# Patient Record
Sex: FEMALE | Race: WHITE | NOT HISPANIC OR LATINO | Employment: FULL TIME | URBAN - METROPOLITAN AREA
[De-identification: names, ages, dates, MRNs, and addresses within clinical notes are randomized per-mention and may not be internally consistent; named-entity substitution may affect disease eponyms.]

---

## 2023-06-20 ENCOUNTER — HOSPITAL ENCOUNTER (OUTPATIENT)
Dept: DATA CONVERSION | Facility: HOSPITAL | Age: 58
End: 2023-06-20
Attending: OPHTHALMOLOGY | Admitting: OPHTHALMOLOGY
Payer: COMMERCIAL

## 2023-06-20 DIAGNOSIS — E78.5 HYPERLIPIDEMIA, UNSPECIFIED: ICD-10-CM

## 2023-06-20 DIAGNOSIS — H02.831 DERMATOCHALASIS OF RIGHT UPPER EYELID: ICD-10-CM

## 2023-06-20 DIAGNOSIS — H02.834 DERMATOCHALASIS OF LEFT UPPER EYELID: ICD-10-CM

## 2023-06-20 DIAGNOSIS — G47.33 OBSTRUCTIVE SLEEP APNEA (ADULT) (PEDIATRIC): ICD-10-CM

## 2023-06-20 DIAGNOSIS — I10 ESSENTIAL (PRIMARY) HYPERTENSION: ICD-10-CM

## 2023-06-20 DIAGNOSIS — K21.9 GASTRO-ESOPHAGEAL REFLUX DISEASE WITHOUT ESOPHAGITIS: ICD-10-CM

## 2023-09-30 NOTE — H&P
History of Present Illness:   Pregnant/Lactating:  ·  Are You Pregnant no   ·  Are You Currently Breastfeeding no     History Present Illness:  Reason for surgery: Bilateral upper eyelid dermatochalasis   HPI:    Pt is a 58 YOF presenting for BL upper eyelid blepharoplasty.    Allergies:        Allergies:  ·  No Known Allergies :     Home Medication Review:   Home Medications Reviewed: no     Impression/Procedure:   ·  Impression and Planned Procedure: BL upper eyelid blepharoplasty       ERAS (Enhanced Recovery After Surgery):  ·  ERAS Patient: no       Physical Exam by System:    Constitutional: Well developed, awake/alert/oriented  x3, no distress, alert and cooperative   Eyes: PERRL, EOMI, clear sclera   Head/Neck: Neck supple   Respiratory/Thorax: Normal WOB   Cardiovascular: Ascultation per anesthesia   Gastrointestinal: abdomen nondistended   Neurological: alert and oriented x3   Psychological: Appropriate mood and behavior   Skin: Warm and dry, no lesions, no rashes     Consent:   COVID-19 Consent:  ·  COVID-19 Risk Consent Surgeon has reviewed key risks related to the risk of yany COVID-19 and if they contract COVID-19 what the risks are.     Attestation:   Note Completion:  I am a:  Resident/Fellow   Attending Attestation I saw and evaluated the patient.  I personally obtained the key and critical portions of the history and physical exam or was physically present for key and  critical portions performed by the resident/fellow. I reviewed the resident/fellow?s documentation and discussed the patient with the resident/fellow.  I agree with the resident/fellow?s medical decision making as documented in the note.     I personally evaluated the patient on 20-Jun-2023         Electronic Signatures:  Ravindra Valle)  (Signed 20-Jun-2023 15:32)   Authored: Note Completion   Co-Signer: History of Present Illness, Allergies, Home Medication Review, Impression/Procedure, ERAS, Physical Exam, Consent,  Note Completion  Megan Torres (Resident))  (Signed 20-Jun-2023 12:21)   Authored: History of Present Illness, Allergies, Home  Medication Review, Impression/Procedure, ERAS, Physical Exam, Consent, Note Completion      Last Updated: 20-Jun-2023 15:32 by Ravindra Valle)

## 2023-10-02 NOTE — OP NOTE
Post Operative Note:     PreOp Diagnosis: Bilateral upper eyelid dermatochalasis   Post-Procedure Diagnosis: Bilateral upper eyelid  dermatochalasis   Procedure: 1. Bilateral upper eyelid blepharoplasty   Surgeon: Viola   Resident/Fellow/Other Assistant: Bishop   Anesthesia: MAC   Estimated Blood Loss (mL): none   Specimen: no   Complications: None   Findings: Bilateral upper eyelid dermatochalasis   Patient Returned To/Condition: PACU/Good     Operative Report Dictated:  Dictation: not applicable - note contains Operative  Report   Operative Report:    The patient was taken to the operating room and placed on the table in the supine position, where anesthesia was induced. 2% lidocaine  with epinephrine and 0.5% marcaine in a 1:1 fashion was injected over the surgical site, and the patient was prepped and draped in the usual manner for orbitofacial surgery.     A 15 Bard-Randall blade incision was made 10 mm from the eyelash margin, across the entire horizontal extent of the right upper eyelid. A second incision was made 10 mm inferior to the junction of the brow and eyelid, and a pinch test was used to ensure  the amount of skin excision was appropriate. The intervening tissue as well as excess orbicularis tissue was excised with Bovie electrocautery on the blended cut setting. Bleeding was controlled with electrocauterization. The skin was then closed with  5-0 fast absorbing gut suture in an interrupted fashion, with care to incorporate the pretarsal orbicularis over the pupil, nasal and temporal limbi respectively, as well as 6-0 prolene suture in a running fashion. The lid position and contour were examined  and found to be satisfactory.     The same procedure was then repeated in the left eye.     The face was then cleaned with sterile saline and antibiotic ophthalmic ointment was placed over the surgical sites.      The patient was then awakened and taken from the operating room in good condition,  having tolerated the procedure well. There were no complications, and the estimated blood loss was less than  1 cc.      Attestation:   Note Completion:  I am a: Resident/Fellow   Attending Attestation I was present for the entire procedure          Electronic Signatures:  Jenniffer Alas (MD (Resident))  (Signed 20-Jun-2023 15:29)   Authored: Post Operative Note  Ravindra Valle)  (Signed 20-Jun-2023 15:36)   Authored: Post Operative Note, Note Completion   Co-Signer: Post Operative Note, Note Completion  Megan Torres (Resident))  (Signed 20-Jun-2023 14:35)   Authored: Post Operative Note, Note Completion      Last Updated: 20-Jun-2023 15:36 by Ravindra Valle)

## 2023-11-28 ENCOUNTER — OFFICE VISIT (OUTPATIENT)
Dept: PRIMARY CARE | Facility: CLINIC | Age: 58
End: 2023-11-28
Payer: COMMERCIAL

## 2023-11-28 VITALS
RESPIRATION RATE: 16 BRPM | HEART RATE: 67 BPM | BODY MASS INDEX: 30.61 KG/M2 | DIASTOLIC BLOOD PRESSURE: 90 MMHG | TEMPERATURE: 96.8 F | HEIGHT: 67 IN | OXYGEN SATURATION: 96 % | WEIGHT: 195 LBS | SYSTOLIC BLOOD PRESSURE: 110 MMHG

## 2023-11-28 DIAGNOSIS — Z00.00 WELLNESS EXAMINATION: ICD-10-CM

## 2023-11-28 DIAGNOSIS — E78.00 PURE HYPERCHOLESTEROLEMIA: ICD-10-CM

## 2023-11-28 DIAGNOSIS — N95.9 MENOPAUSAL DISORDER: Primary | ICD-10-CM

## 2023-11-28 DIAGNOSIS — G47.30 SLEEP APNEA, UNSPECIFIED TYPE: ICD-10-CM

## 2023-11-28 DIAGNOSIS — Z29.9 PREVENTIVE MEASURE: ICD-10-CM

## 2023-11-28 PROCEDURE — 99204 OFFICE O/P NEW MOD 45 MIN: CPT | Performed by: INTERNAL MEDICINE

## 2023-11-28 RX ORDER — PRAVASTATIN SODIUM 80 MG/1
80 TABLET ORAL DAILY
COMMUNITY
Start: 2023-09-06 | End: 2024-01-05 | Stop reason: SDUPTHER

## 2023-11-28 RX ORDER — SERTRALINE HYDROCHLORIDE 100 MG/1
100 TABLET, FILM COATED ORAL NIGHTLY
COMMUNITY
Start: 2023-09-14 | End: 2023-12-23 | Stop reason: SDUPTHER

## 2023-11-28 RX ORDER — OMEPRAZOLE 20 MG/1
20 CAPSULE, DELAYED RELEASE ORAL
COMMUNITY
End: 2024-03-26 | Stop reason: DRUGHIGH

## 2023-11-28 ASSESSMENT — ENCOUNTER SYMPTOMS
JOINT SWELLING: 0
BACK PAIN: 0
DYSURIA: 0
BLOOD IN STOOL: 0
EYE REDNESS: 0
FATIGUE: 0
AGITATION: 0
ARTHRALGIAS: 0
COUGH: 0
NUMBNESS: 0
SHORTNESS OF BREATH: 0
ABDOMINAL PAIN: 0
WHEEZING: 0
WEAKNESS: 0
ADENOPATHY: 0
ENDOCRINE NEGATIVE: 1
DIZZINESS: 0
HEMATURIA: 0
FREQUENCY: 0
HEADACHES: 0
ALLERGIC/IMMUNOLOGIC NEGATIVE: 1
FEVER: 0
CONSTIPATION: 0
PALPITATIONS: 0
ACTIVITY CHANGE: 0

## 2023-11-28 NOTE — PROGRESS NOTES
"Subjective   Patient ID: Piedad Swann is a 58 y.o. female who presents for Establish Care.    She is a Nurse Practitioner , and here to get established.  She has several medical issues , meds reviewd.  She has bladder CA , getting treatment per urology, Dr Grace  She is Exsmoker, smoked < 1/2 ppd, quit in 2019  She has sleep apnea, but does not use cpap currently.    + FH of cancers  Mom- DM, Breast CA  Sis -  , Cholangiocarcinoma  Colon cancer in family         Review of Systems   Constitutional:  Negative for activity change, fatigue and fever.   HENT:  Negative for congestion.    Eyes:  Negative for redness and visual disturbance.   Respiratory:  Negative for cough, shortness of breath and wheezing.    Cardiovascular:  Negative for chest pain, palpitations and leg swelling.   Gastrointestinal:  Negative for abdominal pain, blood in stool and constipation.   Endocrine: Negative.    Genitourinary:  Negative for dysuria, frequency, hematuria and urgency.        Bladder spasms   Musculoskeletal:  Negative for arthralgias, back pain and joint swelling.   Skin:  Negative for rash.   Allergic/Immunologic: Negative.    Neurological:  Negative for dizziness, weakness, numbness and headaches.        Snoring, daytime sleepiness   Hematological:  Negative for adenopathy.   Psychiatric/Behavioral:  Negative for agitation and behavioral problems.        Objective   /90 (BP Location: Left arm, Patient Position: Sitting, BP Cuff Size: Adult)   Pulse 67   Temp 36 °C (96.8 °F) (Temporal)   Resp 16   Ht 1.702 m (5' 7\")   Wt 88.5 kg (195 lb)   SpO2 96%   BMI 30.54 kg/m²     Physical Exam  Constitutional:       Appearance: Normal appearance.   HENT:      Head: Normocephalic and atraumatic.      Right Ear: Tympanic membrane and external ear normal.      Left Ear: Tympanic membrane and external ear normal.      Nose: No congestion.      Mouth/Throat:      Mouth: Mucous membranes are moist.      Pharynx: Oropharynx " is clear.   Eyes:      Extraocular Movements: Extraocular movements intact.      Conjunctiva/sclera: Conjunctivae normal.      Pupils: Pupils are equal, round, and reactive to light.   Cardiovascular:      Rate and Rhythm: Normal rate and regular rhythm.      Pulses: Normal pulses.      Heart sounds: Normal heart sounds. No murmur heard.  Pulmonary:      Effort: Pulmonary effort is normal.      Breath sounds: Normal breath sounds. No wheezing or rales.   Abdominal:      General: Abdomen is flat. Bowel sounds are normal.      Palpations: Abdomen is soft.      Hernia: No hernia is present.   Musculoskeletal:         General: No swelling or tenderness. Normal range of motion.      Cervical back: Normal range of motion and neck supple.      Right lower leg: No edema.      Left lower leg: No edema.   Skin:     General: Skin is warm and dry.      Capillary Refill: Capillary refill takes less than 2 seconds.      Findings: No rash.   Neurological:      General: No focal deficit present.      Mental Status: She is alert and oriented to person, place, and time.   Psychiatric:         Mood and Affect: Mood normal.         Behavior: Behavior normal.         Assessment/Plan        Bladder Carcinoma:  Since 2019   She follows with Dr Grace  Plan for bladeer wash chemo soon  She had multiple cystoscopy    Sleep apnea:  Refer to Dr Morley , sleep Medicine  Sleep study, cpap titration     GERD:  Avoid spicy , fried food, caffeine, NSAIDs  She does not smoke, alcohol socially     Hyperlipidemia:  Statin    Anxiety and depression:  Zoloft po daily    Postmenopausal:  Mammogram , BMD  Calcium and vit D    Vision check  Flushot, covid booster advised  Colonoscopy , not due, had once, and was normal

## 2023-12-12 ENCOUNTER — APPOINTMENT (OUTPATIENT)
Dept: RADIOLOGY | Facility: CLINIC | Age: 58
End: 2023-12-12
Payer: COMMERCIAL

## 2023-12-23 DIAGNOSIS — F33.9 EPISODE OF RECURRENT MAJOR DEPRESSIVE DISORDER, UNSPECIFIED DEPRESSION EPISODE SEVERITY (CMS-HCC): Primary | ICD-10-CM

## 2023-12-23 DIAGNOSIS — F33.9 EPISODE OF RECURRENT MAJOR DEPRESSIVE DISORDER, UNSPECIFIED DEPRESSION EPISODE SEVERITY (CMS-HCC): ICD-10-CM

## 2023-12-23 RX ORDER — SERTRALINE HYDROCHLORIDE 100 MG/1
100 TABLET, FILM COATED ORAL NIGHTLY
Qty: 30 TABLET | Refills: 0 | Status: SHIPPED | OUTPATIENT
Start: 2023-12-23 | End: 2023-12-26

## 2023-12-26 RX ORDER — SERTRALINE HYDROCHLORIDE 100 MG/1
100 TABLET, FILM COATED ORAL NIGHTLY
Qty: 30 TABLET | Refills: 0 | Status: SHIPPED | OUTPATIENT
Start: 2023-12-26 | End: 2024-01-05 | Stop reason: SDUPTHER

## 2024-01-02 ENCOUNTER — APPOINTMENT (OUTPATIENT)
Dept: RADIOLOGY | Facility: CLINIC | Age: 59
End: 2024-01-02
Payer: COMMERCIAL

## 2024-01-05 DIAGNOSIS — F33.9 EPISODE OF RECURRENT MAJOR DEPRESSIVE DISORDER, UNSPECIFIED DEPRESSION EPISODE SEVERITY (CMS-HCC): ICD-10-CM

## 2024-01-05 RX ORDER — SERTRALINE HYDROCHLORIDE 100 MG/1
100 TABLET, FILM COATED ORAL NIGHTLY
Qty: 30 TABLET | Refills: 0 | Status: SHIPPED | OUTPATIENT
Start: 2024-01-05 | End: 2024-01-25 | Stop reason: SDUPTHER

## 2024-01-05 RX ORDER — PRAVASTATIN SODIUM 80 MG/1
80 TABLET ORAL DAILY
Qty: 90 TABLET | Refills: 1 | Status: SHIPPED | OUTPATIENT
Start: 2024-01-05 | End: 2024-03-26 | Stop reason: SDUPTHER

## 2024-01-05 NOTE — TELEPHONE ENCOUNTER
An error occurred while processing the e-prescribing message. THIS MESSAGE CAME UP ON THE LAST REFILL MESSAGE 12/26/2023    SHE NEVER GOT HER SCRIPT    ASKING IF THIS CAN BE SENT IN AGAIN?

## 2024-01-09 ENCOUNTER — APPOINTMENT (OUTPATIENT)
Dept: RADIOLOGY | Facility: CLINIC | Age: 59
End: 2024-01-09
Payer: COMMERCIAL

## 2024-01-16 ENCOUNTER — APPOINTMENT (OUTPATIENT)
Dept: RADIOLOGY | Facility: CLINIC | Age: 59
End: 2024-01-16
Payer: COMMERCIAL

## 2024-01-18 ENCOUNTER — ANCILLARY PROCEDURE (OUTPATIENT)
Dept: RADIOLOGY | Facility: CLINIC | Age: 59
End: 2024-01-18
Payer: COMMERCIAL

## 2024-01-18 DIAGNOSIS — N95.9 MENOPAUSAL DISORDER: ICD-10-CM

## 2024-01-18 DIAGNOSIS — Z29.9 PREVENTIVE MEASURE: ICD-10-CM

## 2024-01-18 PROCEDURE — 77067 SCR MAMMO BI INCL CAD: CPT | Performed by: RADIOLOGY

## 2024-01-18 PROCEDURE — 77063 BREAST TOMOSYNTHESIS BI: CPT | Performed by: RADIOLOGY

## 2024-01-18 PROCEDURE — 77067 SCR MAMMO BI INCL CAD: CPT

## 2024-01-19 ENCOUNTER — HOSPITAL ENCOUNTER (OUTPATIENT)
Dept: RADIOLOGY | Facility: EXTERNAL LOCATION | Age: 59
Discharge: HOME | End: 2024-01-19

## 2024-01-25 ENCOUNTER — TELEPHONE (OUTPATIENT)
Dept: PRIMARY CARE | Facility: CLINIC | Age: 59
End: 2024-01-25
Payer: COMMERCIAL

## 2024-01-25 DIAGNOSIS — F33.9 EPISODE OF RECURRENT MAJOR DEPRESSIVE DISORDER, UNSPECIFIED DEPRESSION EPISODE SEVERITY (CMS-HCC): ICD-10-CM

## 2024-01-25 RX ORDER — SERTRALINE HYDROCHLORIDE 100 MG/1
100 TABLET, FILM COATED ORAL NIGHTLY
Qty: 90 TABLET | Refills: 0 | Status: SHIPPED | OUTPATIENT
Start: 2024-01-25 | End: 2024-03-26 | Stop reason: SDUPTHER

## 2024-01-25 NOTE — TELEPHONE ENCOUNTER
Patient called asking for her Sertraline 100 mg to be sent as a 90 day qty. Instead of it being a qty. Of 30.  Thank you.    Sent to pharmacy in chart.

## 2024-01-25 NOTE — TELEPHONE ENCOUNTER
----- Message from Eric Shipman MD sent at 1/23/2024  3:08 PM EST -----  Normal mammogram report, negative

## 2024-03-05 ENCOUNTER — TELEPHONE (OUTPATIENT)
Dept: PRIMARY CARE | Facility: CLINIC | Age: 59
End: 2024-03-05
Payer: COMMERCIAL

## 2024-03-05 ENCOUNTER — HOSPITAL ENCOUNTER (OUTPATIENT)
Dept: RADIOLOGY | Facility: CLINIC | Age: 59
Discharge: HOME | End: 2024-03-05
Payer: COMMERCIAL

## 2024-03-05 DIAGNOSIS — N95.9 MENOPAUSAL DISORDER: ICD-10-CM

## 2024-03-05 PROCEDURE — 77080 DXA BONE DENSITY AXIAL: CPT

## 2024-03-05 PROCEDURE — 77080 DXA BONE DENSITY AXIAL: CPT | Performed by: RADIOLOGY

## 2024-03-23 ENCOUNTER — LAB (OUTPATIENT)
Dept: LAB | Facility: LAB | Age: 59
End: 2024-03-23
Payer: COMMERCIAL

## 2024-03-23 DIAGNOSIS — E78.00 PURE HYPERCHOLESTEROLEMIA: ICD-10-CM

## 2024-03-23 DIAGNOSIS — N95.9 MENOPAUSAL DISORDER: ICD-10-CM

## 2024-03-23 DIAGNOSIS — Z00.00 WELLNESS EXAMINATION: ICD-10-CM

## 2024-03-23 DIAGNOSIS — G47.30 SLEEP APNEA, UNSPECIFIED TYPE: ICD-10-CM

## 2024-03-23 PROCEDURE — 85025 COMPLETE CBC W/AUTO DIFF WBC: CPT

## 2024-03-23 PROCEDURE — 80053 COMPREHEN METABOLIC PANEL: CPT

## 2024-03-23 PROCEDURE — 36415 COLL VENOUS BLD VENIPUNCTURE: CPT

## 2024-03-23 PROCEDURE — 83036 HEMOGLOBIN GLYCOSYLATED A1C: CPT

## 2024-03-23 PROCEDURE — 82306 VITAMIN D 25 HYDROXY: CPT

## 2024-03-23 PROCEDURE — 84443 ASSAY THYROID STIM HORMONE: CPT

## 2024-03-23 PROCEDURE — 80061 LIPID PANEL: CPT

## 2024-03-24 LAB
25(OH)D3 SERPL-MCNC: 26 NG/ML (ref 30–100)
ALBUMIN SERPL BCP-MCNC: 4.3 G/DL (ref 3.4–5)
ALP SERPL-CCNC: 88 U/L (ref 33–110)
ALT SERPL W P-5'-P-CCNC: 31 U/L (ref 7–45)
ANION GAP SERPL CALC-SCNC: 14 MMOL/L (ref 10–20)
AST SERPL W P-5'-P-CCNC: 21 U/L (ref 9–39)
BASOPHILS # BLD AUTO: 0.06 X10*3/UL (ref 0–0.1)
BASOPHILS NFR BLD AUTO: 0.8 %
BILIRUB SERPL-MCNC: 0.4 MG/DL (ref 0–1.2)
BUN SERPL-MCNC: 10 MG/DL (ref 6–23)
CALCIUM SERPL-MCNC: 9.4 MG/DL (ref 8.6–10.6)
CHLORIDE SERPL-SCNC: 103 MMOL/L (ref 98–107)
CHOLEST SERPL-MCNC: 222 MG/DL (ref 0–199)
CHOLESTEROL/HDL RATIO: 3.9
CO2 SERPL-SCNC: 29 MMOL/L (ref 21–32)
CREAT SERPL-MCNC: 0.7 MG/DL (ref 0.5–1.05)
EGFRCR SERPLBLD CKD-EPI 2021: >90 ML/MIN/1.73M*2
EOSINOPHIL # BLD AUTO: 0.13 X10*3/UL (ref 0–0.7)
EOSINOPHIL NFR BLD AUTO: 1.8 %
ERYTHROCYTE [DISTWIDTH] IN BLOOD BY AUTOMATED COUNT: 12.4 % (ref 11.5–14.5)
EST. AVERAGE GLUCOSE BLD GHB EST-MCNC: 120 MG/DL
GLUCOSE SERPL-MCNC: 107 MG/DL (ref 74–99)
HBA1C MFR BLD: 5.8 %
HCT VFR BLD AUTO: 44.3 % (ref 36–46)
HDLC SERPL-MCNC: 56.9 MG/DL
HGB BLD-MCNC: 14.3 G/DL (ref 12–16)
IMM GRANULOCYTES # BLD AUTO: 0.03 X10*3/UL (ref 0–0.7)
IMM GRANULOCYTES NFR BLD AUTO: 0.4 % (ref 0–0.9)
LDLC SERPL CALC-MCNC: 127 MG/DL
LYMPHOCYTES # BLD AUTO: 2.27 X10*3/UL (ref 1.2–4.8)
LYMPHOCYTES NFR BLD AUTO: 31.7 %
MCH RBC QN AUTO: 29.6 PG (ref 26–34)
MCHC RBC AUTO-ENTMCNC: 32.3 G/DL (ref 32–36)
MCV RBC AUTO: 92 FL (ref 80–100)
MONOCYTES # BLD AUTO: 0.36 X10*3/UL (ref 0.1–1)
MONOCYTES NFR BLD AUTO: 5 %
NEUTROPHILS # BLD AUTO: 4.31 X10*3/UL (ref 1.2–7.7)
NEUTROPHILS NFR BLD AUTO: 60.3 %
NON HDL CHOLESTEROL: 165 MG/DL (ref 0–149)
NRBC BLD-RTO: 0 /100 WBCS (ref 0–0)
PLATELET # BLD AUTO: 272 X10*3/UL (ref 150–450)
POTASSIUM SERPL-SCNC: 4.1 MMOL/L (ref 3.5–5.3)
PROT SERPL-MCNC: 7 G/DL (ref 6.4–8.2)
RBC # BLD AUTO: 4.83 X10*6/UL (ref 4–5.2)
SODIUM SERPL-SCNC: 142 MMOL/L (ref 136–145)
TRIGL SERPL-MCNC: 191 MG/DL (ref 0–149)
TSH SERPL-ACNC: 2.93 MIU/L (ref 0.44–3.98)
VLDL: 38 MG/DL (ref 0–40)
WBC # BLD AUTO: 7.2 X10*3/UL (ref 4.4–11.3)

## 2024-03-26 ENCOUNTER — OFFICE VISIT (OUTPATIENT)
Dept: PRIMARY CARE | Facility: CLINIC | Age: 59
End: 2024-03-26
Payer: COMMERCIAL

## 2024-03-26 VITALS
SYSTOLIC BLOOD PRESSURE: 120 MMHG | RESPIRATION RATE: 16 BRPM | HEART RATE: 84 BPM | TEMPERATURE: 97.3 F | OXYGEN SATURATION: 96 % | BODY MASS INDEX: 31.39 KG/M2 | WEIGHT: 200 LBS | DIASTOLIC BLOOD PRESSURE: 72 MMHG | HEIGHT: 67 IN

## 2024-03-26 DIAGNOSIS — F33.9 EPISODE OF RECURRENT MAJOR DEPRESSIVE DISORDER, UNSPECIFIED DEPRESSION EPISODE SEVERITY (CMS-HCC): ICD-10-CM

## 2024-03-26 PROCEDURE — 99214 OFFICE O/P EST MOD 30 MIN: CPT | Performed by: INTERNAL MEDICINE

## 2024-03-26 PROCEDURE — 1036F TOBACCO NON-USER: CPT | Performed by: INTERNAL MEDICINE

## 2024-03-26 RX ORDER — OMEPRAZOLE 40 MG/1
40 CAPSULE, DELAYED RELEASE ORAL
Qty: 90 CAPSULE | Refills: 1 | Status: SHIPPED | OUTPATIENT
Start: 2024-03-26

## 2024-03-26 RX ORDER — SERTRALINE HYDROCHLORIDE 100 MG/1
100 TABLET, FILM COATED ORAL NIGHTLY
Qty: 90 TABLET | Refills: 1 | Status: SHIPPED | OUTPATIENT
Start: 2024-03-26

## 2024-03-26 RX ORDER — PRAVASTATIN SODIUM 80 MG/1
80 TABLET ORAL DAILY
Qty: 90 TABLET | Refills: 1 | Status: SHIPPED | OUTPATIENT
Start: 2024-03-26

## 2024-03-26 ASSESSMENT — ENCOUNTER SYMPTOMS
EYE REDNESS: 0
FATIGUE: 0
WHEEZING: 0
HEMATURIA: 0
ADENOPATHY: 0
DYSURIA: 0
FEVER: 0
ENDOCRINE NEGATIVE: 1
CONSTIPATION: 0
COUGH: 0
ABDOMINAL PAIN: 0
DIZZINESS: 0
PALPITATIONS: 0
JOINT SWELLING: 0
AGITATION: 0
WEAKNESS: 0
ACTIVITY CHANGE: 0
ALLERGIC/IMMUNOLOGIC NEGATIVE: 1
FREQUENCY: 0
BLOOD IN STOOL: 0
NUMBNESS: 0
HEADACHES: 0
ARTHRALGIAS: 1
SHORTNESS OF BREATH: 0
BACK PAIN: 1

## 2024-03-26 NOTE — PROGRESS NOTES
"Subjective   Patient ID: Piedad Swann is a 59 y.o. female who presents for 4 MONTH FOLLOW UP.    She is a Nurse Practitioner , and recently established here.  She has several medical issues , meds reviewd.  She has bladder CA , getting treatment per urology, Dr Magana  She is Exsmoker, smoked < 1/2 ppd, quit in 2019  She has sleep apnea, but does not use cpap currently , await for a sleep study , scheduled.    + FH of cancers  Mom- DM, Breast CA  Sis -  , Cholangiocarcinoma  Colon cancer in family    Concern for weight gain, yet to start exercises. She has neck , upper back pain sometimes.She had CT which shows multilevel DJD.         Review of Systems   Constitutional:  Negative for activity change, fatigue and fever.   HENT:  Negative for congestion.    Eyes:  Negative for redness and visual disturbance.   Respiratory:  Negative for cough, shortness of breath and wheezing.    Cardiovascular:  Negative for chest pain, palpitations and leg swelling.   Gastrointestinal:  Negative for abdominal pain, blood in stool and constipation.   Endocrine: Negative.    Genitourinary:  Negative for dysuria, frequency, hematuria and urgency.        Bladder spasms  She had BCG treatments , cystoscopy   Musculoskeletal:  Positive for arthralgias and back pain. Negative for joint swelling.        Diffuse joint pain  C/o neck , upper back pain   Skin:  Negative for rash.   Allergic/Immunologic: Negative.    Neurological:  Negative for dizziness, weakness, numbness and headaches.        Snoring, daytime sleepiness   Hematological:  Negative for adenopathy.   Psychiatric/Behavioral:  Negative for agitation and behavioral problems.        Objective   Pulse 84   Temp 36.3 °C (97.3 °F) (Temporal)   Resp 16   Ht 1.702 m (5' 7\")   Wt 90.7 kg (200 lb)   SpO2 96%   BMI 31.32 kg/m²     Physical Exam  Constitutional:       Appearance: Normal appearance.   HENT:      Nose: No congestion.      Mouth/Throat:      Mouth: Mucous membranes " are moist.      Pharynx: Oropharynx is clear.   Eyes:      Conjunctiva/sclera: Conjunctivae normal.   Cardiovascular:      Rate and Rhythm: Normal rate and regular rhythm.      Pulses: Normal pulses.      Heart sounds: Normal heart sounds. No murmur heard.  Pulmonary:      Effort: Pulmonary effort is normal.      Breath sounds: Normal breath sounds. No wheezing or rales.   Abdominal:      General: Abdomen is flat. Bowel sounds are normal.      Palpations: Abdomen is soft.      Hernia: No hernia is present.   Musculoskeletal:         General: No swelling or tenderness. Normal range of motion.      Cervical back: Normal range of motion and neck supple.      Right lower leg: No edema.      Left lower leg: No edema.   Skin:     General: Skin is warm and dry.      Capillary Refill: Capillary refill takes less than 2 seconds.      Findings: No rash.   Neurological:      General: No focal deficit present.      Mental Status: She is alert and oriented to person, place, and time.   Psychiatric:         Mood and Affect: Mood normal.         Behavior: Behavior normal.         Assessment/Plan        Bladder Carcinoma:  Since 2019   She was seeing  Dr Grace, now seeing Dr Virgilio Magana  Treated with induction BCG x 6 treatments, completed in February    She had multiple cystoscopy , next on 5/30/24    Sleep apnea:  Referred to sleep Medicine, has appt in April Dr Elisha Schwab  Sleep study, cpap titration     GERD:  Avoid spicy , fried food, caffeine, NSAIDs  She does not smoke, alcohol socially     Hyperlipidemia:  Statin    Anxiety and depression:  Zoloft po daily    Postmenopausal:  Mammogram , BMD reported normal  Calcium and vit D    Vision check  Flushot, covid booster advised  Colonoscopy , not due, had once, and was normal

## 2024-04-30 ENCOUNTER — OFFICE VISIT (OUTPATIENT)
Dept: SLEEP MEDICINE | Facility: CLINIC | Age: 59
End: 2024-04-30
Payer: COMMERCIAL

## 2024-04-30 VITALS
HEART RATE: 65 BPM | OXYGEN SATURATION: 96 % | BODY MASS INDEX: 31.4 KG/M2 | TEMPERATURE: 97.6 F | HEIGHT: 67 IN | WEIGHT: 200.1 LBS | SYSTOLIC BLOOD PRESSURE: 122 MMHG | DIASTOLIC BLOOD PRESSURE: 74 MMHG | RESPIRATION RATE: 16 BRPM

## 2024-04-30 DIAGNOSIS — G47.33 OBSTRUCTIVE SLEEP APNEA: Primary | ICD-10-CM

## 2024-04-30 DIAGNOSIS — Z78.9 INTOLERANCE OF CONTINUOUS POSITIVE AIRWAY PRESSURE (CPAP) VENTILATION: ICD-10-CM

## 2024-04-30 PROCEDURE — 99214 OFFICE O/P EST MOD 30 MIN: CPT | Performed by: INTERNAL MEDICINE

## 2024-04-30 PROCEDURE — G2211 COMPLEX E/M VISIT ADD ON: HCPCS | Performed by: INTERNAL MEDICINE

## 2024-04-30 PROCEDURE — 1036F TOBACCO NON-USER: CPT | Performed by: INTERNAL MEDICINE

## 2024-04-30 PROCEDURE — 99204 OFFICE O/P NEW MOD 45 MIN: CPT | Performed by: INTERNAL MEDICINE

## 2024-04-30 PROCEDURE — 3008F BODY MASS INDEX DOCD: CPT | Performed by: INTERNAL MEDICINE

## 2024-04-30 ASSESSMENT — SLEEP AND FATIGUE QUESTIONNAIRES
SATISFACTION_WITH_CURRENT_SLEEP_PATTERN: DISSATISFIED
SITING INACTIVE IN A PUBLIC PLACE LIKE A CLASS ROOM OR A MOVIE THEATER: MODERATE CHANCE OF DOZING
DIFFICULTY_FALLING_ASLEEP: MODERATE
SLEEP_PROBLEM_NOTICEABLE_TO_OTHERS: MUCH
HOW LIKELY ARE YOU TO NOD OFF OR FALL ASLEEP WHILE SITTING QUIETLY AFTER LUNCH WITHOUT ALCOHOL: MODERATE CHANCE OF DOZING
HOW LIKELY ARE YOU TO NOD OFF OR FALL ASLEEP WHILE SITTING AND READING: MODERATE CHANCE OF DOZING
DIFFICULTY_STAYING_ASLEEP: MODERATE
HOW LIKELY ARE YOU TO NOD OFF OR FALL ASLEEP WHEN YOU ARE A PASSENGER IN A CAR FOR AN HOUR WITHOUT A BREAK: HIGH CHANCE OF DOZING
ESS-CHAD TOTAL SCORE: 14
HOW LIKELY ARE YOU TO NOD OFF OR FALL ASLEEP IN A CAR, WHILE STOPPED FOR A FEW MINUTES IN TRAFFIC: SLIGHT CHANCE OF DOZING
SLEEP_PROBLEM_INTERFERES_DAILY_ACTIVITIES: MUCH
WORRIED_DISTRESSED_DUE_TO_SLEEP: SOMEWHAT
HOW LIKELY ARE YOU TO NOD OFF OR FALL ASLEEP WHILE LYING DOWN TO REST IN THE AFTERNOON WHEN CIRCUMSTANCES PERMIT: HIGH CHANCE OF DOZING
HOW LIKELY ARE YOU TO NOD OFF OR FALL ASLEEP WHILE SITTING AND TALKING TO SOMEONE: WOULD NEVER DOZE
HOW LIKELY ARE YOU TO NOD OFF OR FALL ASLEEP WHILE WATCHING TV: SLIGHT CHANCE OF DOZING

## 2024-04-30 NOTE — PROGRESS NOTES
"[facility]  NEW CONSULT VISIT NOTE    PCP: Eric Shipman MD  Referred by: No ref. provider found    HISTORY OF PRESENT ILLNESS     Patient ID: Robyn Swann \"Gordon" is a 59 y.o. female who presents to [facility] for a comprehensive sleep medicine evaluation with concerns of sleep apnea with CPAP intolerance.    Patient is here alone today.  To review, patient's medical history is notable for obesity (BMI 31), GERD, and RADHA with CPAP intolerance.       Patient was diagnosed with RADHA in 2014 by PSG and was using CPAP intermittently for 6 months then stopped due to hose entanglement and sensation of suffocation. Patient decline to re-try CPAP even with a different mask.    SLEEP STUDY HISTORY (personally reviewed raw data such as interpretation report, data sheet, hypnogram, and titration table if available and applicable)  No available records on file    SLEEP-WAKE SCHEDULE  Bedtime:  11 PM to 12 MN daily  Subjective sleep latency: 30-60 minutes  Difficulty falling asleep: yes due to caffeine  Number of awakenings:  2 times per night to go to bathroom   Nocturia: Yes  Falls back asleep right away  Final wake time:  6:30 AM on weekdays, 8-9 AM on weekends  Out of bed time:  6:30 AM on weekdays, 8-9 AM on weekends  Shift work: No  Naps: 3x per week for 1-2 hours  Feels rested after a nap: Yes  Average sleep duration (excluding naps): 5-6 hours     SLEEP ENVIRONMENT  Sleep location:  bed  Sleep status: sleeps with   Preferred sleep position: side  TV in bedroom: yes but not turned on  Room is dark: Yes  Room is quiet: Yes  Room is cool: Yes    SLEEP HABITS  Smoking: no  ETOH: no  Marijuana: no  Caffeine: 2 Monsters and 2 bottles of Mountain Dew  Sleep aids: no    SLEEP ROS  Night symptoms: POSITIVE for snoring, witnessed apnea, mouth breathing, heartburn or sour taste in mouth at night, nocturnal cough, and nocturia and NEGATIVE for wake up gasping " and/or choking for air, nasal congestion , night sweats during sleep, and waking up with racing heart  Morning symptoms: POSITIVE for unrefreshing sleep, morning headache, morning dry mouth, and morning sore throat  Daytime symptoms POSITIVE for excessive daytime sleepiness, fatigue, trouble staying focused in daytime, and drowsy driving and NEGATIVE for trouble remembering things in daytime and irritability in daytime  Hypersomnia / narcolepsy symptoms: Patient denies symptoms of a hypersomnolence disorder such as sleep paralysis, sleep-related hallucinations, recurrent sleep attacks, automatic behaviors, and cataplexy.   Parasomnia symptoms: POSITIVE for sleep talking Used to have sleepwalking as a child but not in adulthood  Movements in sleep: Patient denies problematic movements in sleep such as seizures during sleep, frequent leg kicks / jerks while asleep, and sleep-related bruxism.    RLS screen: Patient denies RLS symptoms.    WEIGHT: gained 50 lbs in 10 years     Claustrophobia: Yes    REVIEW OF SYSTEMS     All other systems have been reviewed and are negative.    ALLERGIES     Allergies   Allergen Reactions    Oxycodone-Acetaminophen Nausea/vomiting     Headache and nausea    Hydrocodone-Acetaminophen Hives     insomnia       MEDICATIONS     Current Outpatient Medications   Medication Sig Dispense Refill    omeprazole (PriLOSEC) 40 mg DR capsule Take 1 capsule (40 mg) by mouth once daily in the morning. Take before meals. Do not crush or chew. 90 capsule 1    pravastatin (Pravachol) 80 mg tablet Take 1 tablet (80 mg) by mouth once daily. 90 tablet 1    sertraline (Zoloft) 100 mg tablet Take 1 tablet (100 mg) by mouth once daily at bedtime. 90 tablet 1     No current facility-administered medications for this visit.       PAST HISTORIES     PERTINENT PAST MEDICAL HISTORY: See HPI    PERTINENT PAST SURGICAL HISTORY for Sleep Medicine:  non-contributory    PERTINENT FAMILY HISTORY for Sleep Medicine:  sleep  "apnea- father, paternal grandmother, paternal uncles    PERTINENT SOCIAL HISTORY:  She  reports that she quit smoking about 3 years ago. Her smoking use included cigarettes. She has never used smokeless tobacco. She reports that she does not currently use alcohol. She reports that she does not use drugs. 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 No significant changes unless documented in the pertinent chart section. Updates made when necessary.     PHYSICAL EXAM     VITAL SIGNS: /74   Pulse 65   Temp 36.4 °C (97.6 °F)   Resp 16   Ht 1.702 m (5' 7\")   Wt 90.8 kg (200 lb 1.6 oz)   SpO2 96%   BMI 31.34 kg/m²     NECK CIRCUMFERENCE: 13 inches    ESS: 14  JERRY: 15    Physical Exam  Constitutional: Awake, not in distress  Head: normocephalic, atraumatic  Craniofacial: no retrognathia  Sinus: no tenderness to palpation  Nose: bilateral inferior turbinate hypertrophy, hard to breathe on left nostril alone  Dental: Class 1 bite, + overjet, no crossbite  Palate: + torus palatini  Oropharynx: Castellano tongue position 4, Mallampati 4, lateral wall narrowing grade 4   Uvula: midline on phonation, edematous  Tongue: Midline on protrusion, + Tongue scalloping, no macroglossia  Lungs: Clear to auscultation bilateral, no rales  Heart: Regular rate and rhythm, no murmurs  Skin: Warm, no rash  Neuro: No tremors, moves all extremities  Psych: alert and oriented to time, place, and person    RESULTS/DATA     No results found for: \"IRON\", \"TRANSFERRIN\", \"IRONSAT\", \"TIBC\", \"FERRITIN\"    Bicarbonate   Date Value Ref Range Status   03/23/2024 29 21 - 32 mmol/L Final       ASSESSMENT/PLAN     Robyn Swann \"Piedad\" is a 59 y.o. female who is seen today in [facility] for the following problems:.     OBSTRUCTIVE SLEEP APNEA of unknown severity  CPAP " intolerance due to pressure and hose intolerance  - Patient had history of CPAP intolerance due to pressure and mask intolerance. Now, interested in [facility] with BMI 31.3. Ordered diagnostic PSG (no split) for [facility] eligibility.   - Discussed sleep apnea in detail to include pathophysiology, risk factors, diagnostic options, treatment options, and cardiovascular / neurologic consequences of untreated RADHA.   - Supportive management as follows: Lose weight. Stay off your back when sleeping. Avoid smoking, alcohol, and sedating medications if applicable. Don't drive when sleepy.    OBESITY  - BMI 31 today.  - Recommend weight loss with diet and exercise.  - Weight loss can help in long term management of RADHA.  - Defer management to PCP.      All of patient's questions were answered. She verbalizes understanding and agreement with my assessment and plan. Refer to after-visit-summary (AVS) for patient education and more details on sleep study preparation, troubleshooting issues with PAP usage, proper cleaning instructions of PAP supplies, and usual recommended replacement schedule for each of the PAP supplies.     Follow-up in 2-3 weeks after sleep study.

## 2024-04-30 NOTE — PATIENT INSTRUCTIONS
"Thank you for coming to the Sleep Medicine Clinic today! Your sleep medicine provider today was: Elisha Schwab MD Below is a summary of your treatment plan, patient education, other important information, and our contact numbers.      TREATMENT PLAN     - Get the following sleep study scheduled: diagnostic in-lab sleep study and no split  - Please read the \"Patient Education\" section below for more detailed information. Try implementing tips, reminders, strategies, and supportive management.   - If not yet done, please sign up for [employer] to make a future schedule, send prescription requests, or send messages.    Follow-up Appointment:   Follow-up in 2-3 weeks after sleep study.    PATIENT EDUCATION     Obstructive Sleep Apnea (RADHA) is a sleep disorder where your upper airway muscles relax during sleep and the airway intermittently and repetitively narrows and collapses leading to partially blocked airway (hypopnea) or completely blocked airway (apnea) which, in turn, can disrupt breathing in sleep, lower oxygen levels while you sleep and cause night time wakings. Because both apnea and hypopnea may cause higher carbon dioxide or low oxygen levels, untreated RADHA can lead to heart arrhythmia, elevation of blood pressure, and make it harder for the body to consolidate memory and facilitate metabolism (leading to higher blood sugars at night). Frequent partial arousals occur during sleep resulting in sleep deprivation and daytime sleepiness. RADHA is associated with an increased risk of cardiovascular disease, stroke, hypertension, and insulin resistance. Moreover, untreated RADHA with excessive daytime sleepiness can increase the risk of motor vehicular accidents.    Some conservative strategies for RADHA regardless of RADHA severity are:   Positional therapy - Avoid sleeping on your back.   Healthy diet and regular exercise to optimize weight is highly encouraged.   Avoid alcohol late in the evening and sedative-hypnotics " as these substances can make sleep apnea worse.   Improve breathing through the nose with intranasal steroid spray, saline rinse, or antihistamines    Safety: Avoid driving vehicle and operating heavy equipment while sleepy. Drowsy driving may lead to life-threatening motor vehicle accidents. A person driving while sleepy is 5 times more likely to have an accident. If you feel sleepy, pull over and take a short power nap (sleep for less than 30 minutes). Otherwise, ask somebody to drive you.    Treatment options for sleep apnea include weight management, positional therapy, Positive Airway Therapy (PAP) therapy, oral appliance therapy, hypoglossal nerve stimulator (Inspire) and select airway surgeries.    Sleep Testing for sleep apnea: The best and ideal way to check out if you have sleep apnea is to do an overnight sleep study in the sleep laboratory. Alternatively, a home sleep apnea test can also be done depending on your insurance and risk factors.     If you are having a home sleep apnea test, kindly allot 1 hour during pickup of the testing kit as you will have to complete paperwork and listen to the sleep technician for in-person on-the-spot demonstration and instructions on how to hook up the testing kit at home. Do the test for 1 day and start off with sleeping on your back. If you sleep on your side in the middle of night or you have always been a side or stomach-sleeper, it is ok as long as you have some time on your back and off-back.     If you are having an overnight in-lab sleep study, please make sure to bring toiletries, a comfy pillow, additional warm blankets, and any nighttime medications (include as-needed inhaler, pain pill, etc) that you may regularly take. Also, be sure to eat dinner before you arrive as we generally do not provide meals inside the sleep testing center. Lastly, in order to fall asleep faster in the sleep testing center, we advise patients to wake up 2 hours earlier on the  morning of scheduled testing and avoid napping 2 days prior testing. Sometimes, your sleep provider may prescribe a sleep aid to be taken at lights out in the sleep testing center. If you are taking a sleep aid, consider having somebody pick you up after the sleep testing.    Overnight sleep studies may be scheduled on a weekday or weekend. We also perform daytime testing for shift workers on a case-by-case basis.    Once you have booked an appointment to do the sleep study, please contact my office for follow-up visit to discuss results.    On the other hand, if you have any of the following, please consider calling the sleep testing center to RESCHEDULE your sleep study appointment:  If you tested positive for COVID within 10 days of your sleep study appointment.  If you were exposed to somebody who was confirmed for COVID within 10 days of your sleep study appointment and now you are having symptoms of possible COVID  If you have fever>100F or any acute symptoms that you think will lead to poor sleep during testing (e.g. new or worsening stuffy nose not relieved by steroid nasal spray)  If you have traveled domestically or internationally in the last month and now you are having symptoms of possible COVID    You can also go to the following EDUCATION WEBSITES for further information:   American Academy of Sleep Medicine http://sleepeducation.org  National Sleep Foundation: https://sleepfoundation.org  American Sleep Apnea Association: https://www.sleepapnea.org (for patients with sleep apnea)  Narcolepsy Network: https://www.narcolepsynetwork.org (for patients with narcolepsy)  [employer]: https://www.wakeupnarcolepsy.org (for patients with narcolepsy)  Hypersomnia Foundation: https://www.hypersomniafoundation.org (for patients with idiopathic hypersomnia)  RLS foundation: https://www.rls.org (for patients with restless leg syndrome)    IMPORTANT INFORMATION     Call 911 for medical emergencies.  Our  offices are generally open from Monday-Friday, 8 am - 5 pm.   There are no supporting services by either the sleep doctors or their staff on weekends and Holidays, or after 5 PM on weekdays.   If you need to get in touch with me, you may either call my office number or you can use [facility].  If a referral for a test, for CPAP, or for another specialist was made, and you have not heard about scheduling this within a week, please call scheduling at [phone] ([phone]).  If you are unable to make your appointment for clinic or an overnight study, kindly call the office or sleep testing center at least 48 hours in advance to cancel and reschedule.  If you are on CPAP, please bring your device's card and/or the device to each clinic appointment.   In case of problems with PAP machine or mask interface, please contact your [employer] (Durable Medical Equipment) company first. DME is the company who provides you the machine and/or PAP supplies.       PRESCRIPTIONS     We require 7 days advanced notice for prescription refills. If we do not receive the request in this time, we cannot guarantee that your medication will be refilled in time.    IMPORTANT PHONE NUMBERS     Sleep Medicine Clinic Fax: [phone]  Appointments (for Pediatric Sleep Clinic): [phone] - option 1  Appointments (for Adult Sleep Clinic): [phone] - option 2  Appointments (For Sleep Studies): [phone] - option 3  Behavioral Sleep Medicine: [phone]  Sleep Surgery: [phone]  Nutrition Service: [phone]  Weight management clinics with endocrinology: [phone]  Bariatric Services: [phone] (includes weight loss medications and weight loss surgery)  [facility]: [phone] (offers holistic approaches to weight management)  ENT (Otolaryngology): [phone]  Headache Clinic (Neurology): [phone]  Neurology: [phone]  Psychiatry: [phone]  Pulmonary Function Testing (PFT) Center:  "[phone]  Pulmonary Medicine: [phone]  Medical Service [employer] [employer]): [phone]      OUR SLEEP TESTING LOCATIONS     Our team will contact you to schedule your sleep study, however, you can contact us as follow:  Main Phone Line (scheduling only): [phone]), option 3  Adult and Pediatric Locations  [facility] (6 years and older): [facility] - 4th floor ([address]) After hours line: [phone]  [facility] (Main campus: All ages): [facility] [place], 6th floor. After hours line: [phone]   [place] (5 years and older; younger considered on case-by-case basis): [address]; [address], [address]. Scheduling  After hours line: [phone]   [place] (6 years and older): [address]; [address]; [address]   [place] (6 years and older): [address] [facility] [address] [facility], [address]  After hours line: [phone]   [place] (13 years and older) in [place]: [address], 2nd floor  After hours line: [phone]  [facility] (13 year and older): [address]  After hours line: [phone]     Adult Only Locations:   [place] (18 years and older): [address], 2nd floor   [place] (18 years and older): [address] [facility]; 4th floor  After hours line: [phone]  [facility] (18 years and older) at [place]: [address]  After hours line: [phone]     CONTACTING YOUR SLEEP MEDICINE PROVIDER AND SLEEP TEAM      For issues with your machine or mask interface, please call your DME provider first. [employer] stands for durable medical company. [employer] is the company who provides you the machine and/or PAP supplies / accessories.   To schedule, cancel, or reschedule SLEEP STUDY APPOINTMENTS, please call the Main Phone Line at [phone] - option 3.   To schedule, cancel, or reschedule CLINIC APPOINTMENTS, you can do it in \"MyChart\", call [phone] " "to speak with my  (Selam Pierce), or call the Main Phone Line at [phone] - option 2  For CLINICAL QUESTIONS or MEDICATION REFILLS, please call direct line for Adult Sleep Nurses at [phone].   Lastly, you can also send a message directly to your provider through \"My Chart\", which is the email service through your  Records Account: [email or web address]       Here at [facility], we wish you a restful sleep!    "

## 2024-05-14 ENCOUNTER — TELEPHONE (OUTPATIENT)
Dept: SLEEP MEDICINE | Facility: CLINIC | Age: 59
End: 2024-05-14
Payer: COMMERCIAL

## 2024-05-14 DIAGNOSIS — G47.33 OBSTRUCTIVE SLEEP APNEA: Primary | ICD-10-CM

## 2024-05-14 NOTE — TELEPHONE ENCOUNTER
Patient called sleep lab to schedule sleep study since no one called her and they told her there was no order placed. Would you please place the sleep study?

## 2024-05-22 NOTE — PROGRESS NOTES
"NPV     HISTORY OF PRESENT ILLNESS:   Robyn Swann is a 59 y.o. female who is being seen as a new patient today for evaluation of bladder cancer.    PAST MEDICAL HISTORY:  No past medical history on file.  - Dx with low grade bladder cancer in 2020  - High grade bladder cancer in 11/2023 and completed BCG induction 2/2024.  PAST SURGICAL HISTORY:  Past Surgical History:   Procedure Laterality Date    BREAST BIOPSY Left     benign ex bx    BREAST CYST ASPIRATION      HYSTERECTOMY          ALLERGIES:   Allergies   Allergen Reactions    Oxycodone-Acetaminophen Nausea/vomiting     Headache and nausea    Hydrocodone-Acetaminophen Hives     insomnia        MEDICATIONS:   Current Outpatient Medications   Medication Instructions    omeprazole (PRILOSEC) 40 mg, oral, Daily before breakfast, Do not crush or chew.    pravastatin (PRAVACHOL) 80 mg, oral, Daily    sertraline (ZOLOFT) 100 mg, oral, Nightly        PHYSICAL EXAM:  There were no vitals taken for this visit.  Constitutional: Patient appears well-developed and well-nourished. No distress.    Pulmonary/Chest: Effort normal. No respiratory distress.   Abdominal: Soft, ND NT  : WNL  Musculoskeletal: Normal range of motion.    Neurological: Alert and oriented to person, place, and time.  Psychiatric: Normal mood and affect. Behavior is normal. Thought content normal.      Labs:  No results found for: \"TESTOSTERONE\"  No results found for: \"PSA\"  No components found for: \"CBC\"  Lab Results   Component Value Date    CREATININE 0.70 03/23/2024     No components found for: \"TESTOTMS\"  No results found for: \"TESTF\"    Discussion:  Doing well, no complaints. Denies any dysuria, hematuria, bothersome urinary frequency, urgency, or flank pain. Discussed starting gemcitabine treatments and having cystoscopic surveillance soon. Will plan for cystoscopy, pt will schedule at earliest convenience.      Assessment:    No diagnosis found.  Bladder Cancer  Robyn Swann is a 59 " y.o. female here for NPV     Plan:   Will plan for cystoscopy and patient will schedule at earliest convenience.  All questions and concerns were addressed. Patient verbalizes understanding and has no other questions at this time.     Scribe Attestation  By signing my name below, IArianna Scribe   attest that this documentation has been prepared under the direction and in the presence of David Jean MD.

## 2024-05-23 ENCOUNTER — OFFICE VISIT (OUTPATIENT)
Dept: UROLOGY | Facility: HOSPITAL | Age: 59
End: 2024-05-23
Payer: COMMERCIAL

## 2024-05-23 DIAGNOSIS — C67.3 MALIGNANT NEOPLASM OF ANTERIOR WALL OF URINARY BLADDER (MULTI): ICD-10-CM

## 2024-05-23 DIAGNOSIS — R39.9 LOWER URINARY TRACT SYMPTOMS (LUTS): Primary | ICD-10-CM

## 2024-05-23 LAB
POC APPEARANCE, URINE: CLEAR
POC BILIRUBIN, URINE: NEGATIVE
POC BLOOD, URINE: ABNORMAL
POC COLOR, URINE: YELLOW
POC GLUCOSE, URINE: NEGATIVE MG/DL
POC KETONES, URINE: NEGATIVE MG/DL
POC LEUKOCYTES, URINE: NEGATIVE
POC NITRITE,URINE: NEGATIVE
POC PH, URINE: 6 PH
POC PROTEIN, URINE: NEGATIVE MG/DL
POC SPECIFIC GRAVITY, URINE: 1.02
POC UROBILINOGEN, URINE: 0.2 EU/DL

## 2024-05-23 PROCEDURE — 81003 URINALYSIS AUTO W/O SCOPE: CPT | Performed by: UROLOGY

## 2024-05-23 PROCEDURE — 3008F BODY MASS INDEX DOCD: CPT | Performed by: UROLOGY

## 2024-05-23 PROCEDURE — 99214 OFFICE O/P EST MOD 30 MIN: CPT | Performed by: UROLOGY

## 2024-05-23 PROCEDURE — 1036F TOBACCO NON-USER: CPT | Performed by: UROLOGY

## 2024-06-05 RX ORDER — CIPROFLOXACIN 500 MG/1
500 TABLET ORAL ONCE
Status: COMPLETED | OUTPATIENT
Start: 2024-06-06 | End: 2024-06-06

## 2024-06-05 NOTE — PROGRESS NOTES
"FUV     HISTORY OF PRESENT ILLNESS:   Robyn Swann is a 59 y.o. female who is being seen as a new patient today for evaluation of bladder cancer.    PAST MEDICAL HISTORY:  No past medical history on file.  - Dx with low grade bladder cancer in 2020  - High grade bladder cancer in 11/2023 and completed BCG induction 2/2024.    PAST SURGICAL HISTORY:  Past Surgical History:   Procedure Laterality Date    BREAST BIOPSY Left     benign ex bx    BREAST CYST ASPIRATION      HYSTERECTOMY          ALLERGIES:   Allergies   Allergen Reactions    Oxycodone-Acetaminophen Nausea/vomiting     Headache and nausea    Hydrocodone-Acetaminophen Hives     insomnia        MEDICATIONS:   Current Outpatient Medications   Medication Instructions    omeprazole (PRILOSEC) 40 mg, oral, Daily before breakfast, Do not crush or chew.    pravastatin (PRAVACHOL) 80 mg, oral, Daily    sertraline (ZOLOFT) 100 mg, oral, Nightly        PHYSICAL EXAM:  There were no vitals taken for this visit.  Constitutional: Patient appears well-developed and well-nourished. No distress.    Pulmonary/Chest: Effort normal. No respiratory distress.   Abdominal: Soft, ND NT  : WNL  Musculoskeletal: Normal range of motion.    Neurological: Alert and oriented to person, place, and time.  Psychiatric: Normal mood and affect. Behavior is normal. Thought content normal.      Labs:  No results found for: \"TESTOSTERONE\"  No results found for: \"PSA\"  No components found for: \"CBC\"  Lab Results   Component Value Date    CREATININE 0.70 03/23/2024     No components found for: \"TESTOTMS\"  No results found for: \"TESTF\"    Discussion:  Doing well, no complaints. Denies any dysuria, hematuria, bothersome urinary frequency, urgency, or flank pain. The cystoscopy was completed today due to bladder cancer and demonstrated no tumors, stones or lesions. Cytology sent. 1 abx given to patient in clinic today. Patient instructed to follow up in 3 months for continued " surveillance.    Cysto surveillance schedule: Will continue to monitor q3 months until 11/2026,  q6 months until 11/2028, and then yearly if no recurrences by year 5.    Assessment:    No diagnosis found.  Bladder Cancer  Robyn Swann is a 59 y.o. female here for FUV     Plan:   Follow up in 3 months for continued cystoscopic surveillance   Plan for renal US in 6 months and follow up after to review results.  All questions and concerns were addressed. Patient verbalizes understanding and has no other questions at this time.     Scribe Attestation  By signing my name below, I, Sherry Simeon   attest that this documentation has been prepared under the direction and in the presence of David Jean MD.

## 2024-06-06 ENCOUNTER — PROCEDURE VISIT (OUTPATIENT)
Dept: UROLOGY | Facility: HOSPITAL | Age: 59
End: 2024-06-06
Payer: COMMERCIAL

## 2024-06-06 DIAGNOSIS — R39.9 LOWER URINARY TRACT SYMPTOMS (LUTS): Primary | ICD-10-CM

## 2024-06-06 DIAGNOSIS — Z79.2 PROPHYLACTIC ANTIBIOTIC: ICD-10-CM

## 2024-06-06 DIAGNOSIS — C67.3 MALIGNANT NEOPLASM OF ANTERIOR WALL OF URINARY BLADDER (MULTI): ICD-10-CM

## 2024-06-06 LAB
POC APPEARANCE, URINE: CLEAR
POC BILIRUBIN, URINE: NEGATIVE
POC BLOOD, URINE: ABNORMAL
POC COLOR, URINE: YELLOW
POC GLUCOSE, URINE: NEGATIVE MG/DL
POC KETONES, URINE: NEGATIVE MG/DL
POC LEUKOCYTES, URINE: NEGATIVE
POC NITRITE,URINE: NEGATIVE
POC PH, URINE: 6.5 PH
POC PROTEIN, URINE: NEGATIVE MG/DL
POC SPECIFIC GRAVITY, URINE: 1.02
POC UROBILINOGEN, URINE: 0.2 EU/DL

## 2024-06-06 PROCEDURE — 2500000001 HC RX 250 WO HCPCS SELF ADMINISTERED DRUGS (ALT 637 FOR MEDICARE OP): Performed by: UROLOGY

## 2024-06-06 PROCEDURE — 52000 CYSTOURETHROSCOPY: CPT | Performed by: UROLOGY

## 2024-06-06 PROCEDURE — 81003 URINALYSIS AUTO W/O SCOPE: CPT | Performed by: UROLOGY

## 2024-06-06 RX ADMIN — CIPROFLOXACIN 500 MG: 500 TABLET ORAL at 15:01

## 2024-06-22 ENCOUNTER — APPOINTMENT (OUTPATIENT)
Dept: SLEEP MEDICINE | Facility: CLINIC | Age: 59
End: 2024-06-22
Payer: COMMERCIAL

## 2024-08-27 ENCOUNTER — APPOINTMENT (OUTPATIENT)
Dept: PRIMARY CARE | Facility: CLINIC | Age: 59
End: 2024-08-27
Payer: COMMERCIAL

## 2024-08-27 VITALS
HEART RATE: 79 BPM | OXYGEN SATURATION: 96 % | RESPIRATION RATE: 18 BRPM | TEMPERATURE: 96.4 F | BODY MASS INDEX: 30.13 KG/M2 | WEIGHT: 192 LBS | HEIGHT: 67 IN | DIASTOLIC BLOOD PRESSURE: 84 MMHG | SYSTOLIC BLOOD PRESSURE: 128 MMHG

## 2024-08-27 DIAGNOSIS — Z00.00 WELLNESS EXAMINATION: ICD-10-CM

## 2024-08-27 DIAGNOSIS — C67.9 MALIGNANT NEOPLASM OF URINARY BLADDER, UNSPECIFIED SITE (MULTI): Primary | ICD-10-CM

## 2024-08-27 DIAGNOSIS — G47.30 SLEEP APNEA, UNSPECIFIED TYPE: ICD-10-CM

## 2024-08-27 DIAGNOSIS — N95.9 MENOPAUSAL DISORDER: ICD-10-CM

## 2024-08-27 DIAGNOSIS — E78.00 PURE HYPERCHOLESTEROLEMIA: ICD-10-CM

## 2024-08-27 PROCEDURE — 3008F BODY MASS INDEX DOCD: CPT | Performed by: INTERNAL MEDICINE

## 2024-08-27 PROCEDURE — 99396 PREV VISIT EST AGE 40-64: CPT | Performed by: INTERNAL MEDICINE

## 2024-08-27 PROCEDURE — 99214 OFFICE O/P EST MOD 30 MIN: CPT | Performed by: INTERNAL MEDICINE

## 2024-08-27 PROCEDURE — 1036F TOBACCO NON-USER: CPT | Performed by: INTERNAL MEDICINE

## 2024-08-27 ASSESSMENT — ENCOUNTER SYMPTOMS
EYE REDNESS: 0
COUGH: 0
ADENOPATHY: 0
JOINT SWELLING: 0
HEADACHES: 0
NUMBNESS: 0
ENDOCRINE NEGATIVE: 1
FEVER: 0
WHEEZING: 0
CONSTIPATION: 0
SHORTNESS OF BREATH: 0
FREQUENCY: 0
ABDOMINAL PAIN: 0
FATIGUE: 0
BLOOD IN STOOL: 0
DIZZINESS: 0
WEAKNESS: 0
ACTIVITY CHANGE: 0
ALLERGIC/IMMUNOLOGIC NEGATIVE: 1
BACK PAIN: 1
DYSURIA: 0
ARTHRALGIAS: 1
AGITATION: 0
PALPITATIONS: 0
HEMATURIA: 0

## 2024-08-27 NOTE — PROGRESS NOTES
"Subjective   Patient ID: Piedad Swann is a 59 y.o. female who presents for Follow-up (4 months).    She is a Nurse Practitioner , and recently established here.  She has several medical issues , meds reviewd.  She has bladder CA , getting treatment per urology, Dr Magana  She is Exsmoker, smoked < 1/2 ppd, quit in 2019  She has sleep apnea, but does not use cpap currently , await for a sleep study , scheduled.    + FH of cancers  Mom- DM, Breast CA  Sis -  , Cholangiocarcinoma  Colon cancer in family    Concern for weight gain, yet to start exercises. She has neck , upper back pain sometimes.She had CT which shows multilevel DJD.         Review of Systems   Constitutional:  Negative for activity change, fatigue and fever.   HENT:  Negative for congestion.    Eyes:  Negative for redness and visual disturbance.   Respiratory:  Negative for cough, shortness of breath and wheezing.    Cardiovascular:  Negative for chest pain, palpitations and leg swelling.   Gastrointestinal:  Negative for abdominal pain, blood in stool and constipation.   Endocrine: Negative.    Genitourinary:  Negative for dysuria, frequency, hematuria and urgency.        Bladder spasms  She had BCG treatments , cystoscopy   Musculoskeletal:  Positive for arthralgias and back pain. Negative for joint swelling.        Diffuse joint pain  C/o neck , upper back pain   Skin:  Negative for rash.   Allergic/Immunologic: Negative.    Neurological:  Negative for dizziness, weakness, numbness and headaches.        Snoring, daytime sleepiness   Hematological:  Negative for adenopathy.   Psychiatric/Behavioral:  Negative for agitation and behavioral problems.        Objective   /84 (BP Location: Left arm, Patient Position: Sitting, BP Cuff Size: Adult)   Pulse 79   Temp 35.8 °C (96.4 °F) (Temporal)   Resp 18   Ht 1.702 m (5' 7\")   Wt 87.1 kg (192 lb)   SpO2 96%   BMI 30.07 kg/m²     Physical Exam  Constitutional:       Appearance: Normal " appearance.   HENT:      Nose: No congestion.      Mouth/Throat:      Mouth: Mucous membranes are moist.      Pharynx: Oropharynx is clear.   Eyes:      Conjunctiva/sclera: Conjunctivae normal.   Cardiovascular:      Rate and Rhythm: Normal rate and regular rhythm.      Pulses: Normal pulses.      Heart sounds: Normal heart sounds. No murmur heard.  Pulmonary:      Effort: Pulmonary effort is normal.      Breath sounds: Normal breath sounds. No wheezing or rales.   Abdominal:      General: Abdomen is flat. Bowel sounds are normal.      Palpations: Abdomen is soft.      Hernia: No hernia is present.   Musculoskeletal:         General: No swelling or tenderness. Normal range of motion.      Cervical back: Normal range of motion and neck supple.      Right lower leg: No edema.      Left lower leg: No edema.   Skin:     General: Skin is warm and dry.      Capillary Refill: Capillary refill takes less than 2 seconds.      Findings: No rash.   Neurological:      General: No focal deficit present.      Mental Status: She is alert and oriented to person, place, and time.   Psychiatric:         Mood and Affect: Mood normal.         Behavior: Behavior normal.         Assessment/Plan        Bladder Carcinoma:    SinDx with low grade bladder cancer in 2020  - High grade bladder cancer in 11/2023 and completed BCG induction 2/2024   She was seeing  Dr Grace, now seeing Dr Virgilio Magana  Treated with induction BCG x 6 treatments, completed in February    She had multiple cystoscopy , last done on 6/06/24 , every 3 months till 11/2026    Sleep apnea:  Referred to sleep Medicine, seen by  Dr Elisha Schwab  Sleep study, cpap titration was ordered  ( Not covered by Insurance )    GERD:  Avoid spicy , fried food, caffeine, NSAIDs  She does not smoke, alcohol socially     Hyperlipidemia:  Statin    Anxiety and depression:  Zoloft po daily    Postmenopausal:  Mammogram , BMD reported normal  Calcium and vit D    Vision  check  Flushot, covid booster advised  Colonoscopy , not due, had once, and was normal

## 2024-09-26 ENCOUNTER — APPOINTMENT (OUTPATIENT)
Dept: UROLOGY | Facility: HOSPITAL | Age: 59
End: 2024-09-26
Payer: COMMERCIAL

## 2024-10-17 RX ORDER — CIPROFLOXACIN 500 MG/1
500 TABLET ORAL ONCE
Status: SHIPPED | OUTPATIENT
Start: 2024-10-24

## 2024-10-23 NOTE — PROGRESS NOTES
"FUV     HISTORY OF PRESENT ILLNESS:   Robyn Swann is a 59 y.o. female who is being seen for cysto for evaluation of bladder cancer.    PAST MEDICAL HISTORY:  No past medical history on file.  - Dx with low grade bladder cancer in 2020  - High grade bladder cancer in 11/2023 and completed BCG induction 2/2024.    PAST SURGICAL HISTORY:  Past Surgical History:   Procedure Laterality Date    BREAST BIOPSY Left     benign ex bx    BREAST CYST ASPIRATION      HYSTERECTOMY          ALLERGIES:   Allergies   Allergen Reactions    Oxycodone-Acetaminophen Nausea/vomiting     Headache and nausea    Hydrocodone-Acetaminophen Hives     insomnia        MEDICATIONS:   Current Outpatient Medications   Medication Instructions    omeprazole (PRILOSEC) 40 mg, oral, Daily before breakfast, Do not crush or chew.    pravastatin (PRAVACHOL) 80 mg, oral, Daily    sertraline (ZOLOFT) 100 mg, oral, Nightly        PHYSICAL EXAM:  There were no vitals taken for this visit.  Constitutional: Patient appears well-developed and well-nourished. No distress.    Pulmonary/Chest: Effort normal. No respiratory distress.   Abdominal: Soft, ND NT  : WNL  Musculoskeletal: Normal range of motion.    Neurological: Alert and oriented to person, place, and time.  Psychiatric: Normal mood and affect. Behavior is normal. Thought content normal.      Labs:  No results found for: \"TESTOSTERONE\"  No results found for: \"PSA\"  No components found for: \"CBC\"  Lab Results   Component Value Date    CREATININE 0.70 03/23/2024     No components found for: \"TESTOTMS\"  No results found for: \"TESTF\"    Discussion:  Doing well, no complaints. Denies any dysuria, hematuria, bothersome urinary frequency, urgency, or flank pain. The cystoscopy was completed today due to bladder cancer and demonstrated no tumors, stones or lesions. Cytology sent. 1 abx given to patient in clinic today. Patient instructed to follow up in 3 months for continued surveillance.    Cysto " surveillance schedule: Will continue to monitor q3 months until 11/2026,  q6 months until 11/2028, and then yearly if no recurrences by year 5.    Assessment:      1. Malignant neoplasm of anterior wall of urinary bladder (Multi)  Cytology Consultation (Non-Gynecologic)    Cystoscopy    US renal complete      2. Prophylactic antibiotic  ciprofloxacin (Cipro) tablet 500 mg        Bladder Cancer  Robyn Swann is a 59 y.o. female here for FUV     Plan:   Follow up in 3 months for continued cystoscopic surveillance   Plan for renal US in 6 months and follow up after to review results.  All questions and concerns were addressed. Patient verbalizes understanding and has no other questions at this time.     Scribe Attestation  By signing my name below, IMinoo Scribe   attest that this documentation has been prepared under the direction and in the presence of David Jean MD.

## 2024-10-24 ENCOUNTER — PROCEDURE VISIT (OUTPATIENT)
Dept: UROLOGY | Facility: HOSPITAL | Age: 59
End: 2024-10-24
Payer: COMMERCIAL

## 2024-10-24 DIAGNOSIS — C67.3 MALIGNANT NEOPLASM OF ANTERIOR WALL OF URINARY BLADDER (MULTI): ICD-10-CM

## 2024-10-24 DIAGNOSIS — Z79.2 PROPHYLACTIC ANTIBIOTIC: ICD-10-CM

## 2024-10-24 PROCEDURE — 52000 CYSTOURETHROSCOPY: CPT | Performed by: UROLOGY

## 2024-10-24 NOTE — PROGRESS NOTES
"FUV     HISTORY OF PRESENT ILLNESS:   Robyn Swann is a 59 y.o. female who is being seen for cysto for evaluation of bladder cancer.     PAST MEDICAL HISTORY:  No past medical history on file.  - Dx with low grade bladder cancer in 2020  - High grade bladder cancer in 11/2023 and completed BCG induction 2/2024.    PAST SURGICAL HISTORY:  Past Surgical History:   Procedure Laterality Date    BREAST BIOPSY Left     benign ex bx    BREAST CYST ASPIRATION      HYSTERECTOMY          ALLERGIES:   Allergies   Allergen Reactions    Oxycodone-Acetaminophen Nausea/vomiting     Headache and nausea    Hydrocodone-Acetaminophen Hives     insomnia        MEDICATIONS:   Current Outpatient Medications   Medication Instructions    omeprazole (PRILOSEC) 40 mg, oral, Daily before breakfast, Do not crush or chew.    pravastatin (PRAVACHOL) 80 mg, oral, Daily    sertraline (ZOLOFT) 100 mg, oral, Nightly        PHYSICAL EXAM:  There were no vitals taken for this visit.  Constitutional: Patient appears well-developed and well-nourished. No distress.    Pulmonary/Chest: Effort normal. No respiratory distress.   Abdominal: Soft, ND NT  : WNL  Musculoskeletal: Normal range of motion.    Neurological: Alert and oriented to person, place, and time.  Psychiatric: Normal mood and affect. Behavior is normal. Thought content normal.      Labs:  No results found for: \"TESTOSTERONE\"  No results found for: \"PSA\"  No components found for: \"CBC\"  Lab Results   Component Value Date    CREATININE 0.70 03/23/2024     No components found for: \"TESTOTMS\"  No results found for: \"TESTF\"    Discussion:   Doing well, no complaints. Denies any dysuria, hematuria, bothersome urinary frequency, urgency, or flank pain. Denies straining or pushing to urinate. The cystoscopy was completed today due to bladder cancer and demonstrated no tumors, stones or lesions. Cytology sent. 1 abx given to patient in clinic today. Patient instructed to follow up in 3 months " for continued surveillance.    Her son, recently  in a MVA and she has a 3 year old granddaughter, .     Cysto surveillance schedule: Will continue to monitor q3 months until 2026,  q6 months until 2028, and then yearly if no recurrences by year 5.    Assessment:      1. Malignant neoplasm of anterior wall of urinary bladder (Multi)  Cytology Consultation (Non-Gynecologic)    Cystoscopy    US renal complete    Cystoscopy    Cystoscopy    Cystoscopy      2. Prophylactic antibiotic  ciprofloxacin (Cipro) tablet 500 mg        Bladder Cancer  Robyn Swann is a 59 y.o. female here for FUV     Plan:   Follow up in 3 months for continued cystoscopic surveillance   Plan for renal US in 6 months and follow up after to review results.  All questions and concerns were addressed. Patient verbalizes understanding and has no other questions at this time.     Scribe Attestation  By signing my name below, Joanna HARRISON Scribe   attest that this documentation has been prepared under the direction and in the presence of David Jean MD.

## 2025-01-21 ENCOUNTER — APPOINTMENT (OUTPATIENT)
Dept: RADIOLOGY | Facility: CLINIC | Age: 60
End: 2025-01-21
Payer: COMMERCIAL

## 2025-01-21 DIAGNOSIS — Z12.31 SCREENING MAMMOGRAM FOR BREAST CANCER: ICD-10-CM

## 2025-01-24 ENCOUNTER — HOSPITAL ENCOUNTER (OUTPATIENT)
Dept: RADIOLOGY | Facility: CLINIC | Age: 60
Discharge: HOME | End: 2025-01-24
Payer: COMMERCIAL

## 2025-01-24 VITALS — HEIGHT: 67 IN | BODY MASS INDEX: 30.13 KG/M2 | WEIGHT: 192 LBS

## 2025-01-24 DIAGNOSIS — Z12.31 SCREENING MAMMOGRAM FOR BREAST CANCER: ICD-10-CM

## 2025-01-24 PROCEDURE — 77063 BREAST TOMOSYNTHESIS BI: CPT

## 2025-02-10 RX ORDER — CIPROFLOXACIN 500 MG/1
500 TABLET ORAL ONCE
Status: COMPLETED | OUTPATIENT
Start: 2025-02-20 | End: 2025-02-20

## 2025-02-19 NOTE — PROGRESS NOTES
"FUV     Last Visit: 10/24/24    HISTORY OF PRESENT ILLNESS:   Robyn Swann is a 59 y.o. female who is being seen for cysto for evaluation of bladder cancer.     PAST MEDICAL HISTORY:  No past medical history on file.  - Dx with low grade bladder cancer in 2020  -negative TURBT in 2020  - High grade bladder cancer in 11/2023 and completed BCG induction 2/2024.    PAST SURGICAL HISTORY:  Past Surgical History:   Procedure Laterality Date    BREAST BIOPSY Left     benign ex bx    BREAST CYST ASPIRATION      HYSTERECTOMY          ALLERGIES:   Allergies   Allergen Reactions    Oxycodone-Acetaminophen Nausea/vomiting     Headache and nausea    Hydrocodone-Acetaminophen Hives     insomnia        MEDICATIONS:   Current Outpatient Medications   Medication Instructions    omeprazole (PRILOSEC) 40 mg, oral, Daily before breakfast, Do not crush or chew.    pravastatin (PRAVACHOL) 80 mg, oral, Daily    sertraline (ZOLOFT) 100 mg, oral, Nightly        PHYSICAL EXAM:  Blood pressure 138/74, pulse 73, temperature 36.1 °C (97 °F).  Constitutional: Patient appears well-developed and well-nourished. No distress.    Pulmonary/Chest: Effort normal. No respiratory distress.   Abdominal: Soft, ND NT  : WNL  Musculoskeletal: Normal range of motion.    Neurological: Alert and oriented to person, place, and time.  Psychiatric: Normal mood and affect. Behavior is normal. Thought content normal.      Labs:  No results found for: \"TESTOSTERONE\"  No results found for: \"PSA\"  No components found for: \"CBC\"  Lab Results   Component Value Date    CREATININE 0.70 03/23/2024     No components found for: \"TESTOTMS\"  No results found for: \"TESTF\"    Discussion:   Doing well, no complaints. Denies any dysuria, hematuria, bothersome urinary frequency, urgency, or flank pain. Denies straining or pushing to urinate. The cystoscopy was completed today due to bladder cancer and demonstrated no tumors, stones or lesions. Cytology sent. 1 abx given to " patient in clinic today. Patient instructed to follow up in 3 months for continued surveillance.    Her son, recently  in a MVA and she has a 3 year old granddaughter, .     Cysto surveillance schedule: Will continue to monitor q3 months until 2026,  q6 months until 2028, and then yearly if no recurrences by year 5.    Assessment:      1. Malignant neoplasm of anterior wall of urinary bladder (Multi)  Cytology Consultation (Non-Gynecologic)    Cystoscopy    Cystoscopy    POCT UA Automated manually resulted      2. Prophylactic antibiotic  ciprofloxacin (Cipro) tablet 500 mg    POCT UA Automated manually resulted        Bladder Cancer  Robyn Swann is a 59 y.o. female here for FUV     Plan:   Follow up in 3 months for continued cystoscopic surveillance   Will plan for a renal US prior to next visit  All questions and concerns were addressed. Patient verbalizes understanding and has no other questions at this time.     Scribe Attestation  By signing my name below, IMinoo, Scribe   attest that this documentation has been prepared under the direction and in the presence of David Jean MD.

## 2025-02-20 ENCOUNTER — PROCEDURE VISIT (OUTPATIENT)
Dept: UROLOGY | Facility: HOSPITAL | Age: 60
End: 2025-02-20
Payer: COMMERCIAL

## 2025-02-20 VITALS — SYSTOLIC BLOOD PRESSURE: 138 MMHG | DIASTOLIC BLOOD PRESSURE: 74 MMHG | HEART RATE: 73 BPM | TEMPERATURE: 97 F

## 2025-02-20 DIAGNOSIS — C67.3 MALIGNANT NEOPLASM OF ANTERIOR WALL OF URINARY BLADDER (MULTI): ICD-10-CM

## 2025-02-20 DIAGNOSIS — C67.3 MALIGNANT NEOPLASM OF ANTERIOR WALL OF URINARY BLADDER (MULTI): Primary | ICD-10-CM

## 2025-02-20 DIAGNOSIS — Z79.2 PROPHYLACTIC ANTIBIOTIC: ICD-10-CM

## 2025-02-20 LAB
POC APPEARANCE, URINE: CLEAR
POC BILIRUBIN, URINE: NEGATIVE
POC BLOOD, URINE: ABNORMAL
POC COLOR, URINE: YELLOW
POC GLUCOSE, URINE: ABNORMAL MG/DL
POC KETONES, URINE: NEGATIVE MG/DL
POC LEUKOCYTES, URINE: NEGATIVE
POC NITRITE,URINE: NEGATIVE
POC PH, URINE: 6.5 PH
POC PROTEIN, URINE: NEGATIVE MG/DL
POC SPECIFIC GRAVITY, URINE: 1.02
POC UROBILINOGEN, URINE: 0.2 EU/DL

## 2025-02-20 PROCEDURE — G2211 COMPLEX E/M VISIT ADD ON: HCPCS | Performed by: UROLOGY

## 2025-02-20 PROCEDURE — 52000 CYSTOURETHROSCOPY: CPT | Performed by: UROLOGY

## 2025-02-20 PROCEDURE — 2500000001 HC RX 250 WO HCPCS SELF ADMINISTERED DRUGS (ALT 637 FOR MEDICARE OP): Performed by: UROLOGY

## 2025-02-20 PROCEDURE — 81003 URINALYSIS AUTO W/O SCOPE: CPT | Performed by: UROLOGY

## 2025-02-20 RX ADMIN — CIPROFLOXACIN 500 MG: 500 TABLET ORAL at 12:14

## 2025-02-27 ENCOUNTER — APPOINTMENT (OUTPATIENT)
Dept: PRIMARY CARE | Facility: CLINIC | Age: 60
End: 2025-02-27
Payer: COMMERCIAL

## 2025-02-27 VITALS
OXYGEN SATURATION: 97 % | SYSTOLIC BLOOD PRESSURE: 122 MMHG | HEART RATE: 72 BPM | DIASTOLIC BLOOD PRESSURE: 70 MMHG | WEIGHT: 200.4 LBS | HEIGHT: 67 IN | BODY MASS INDEX: 31.45 KG/M2

## 2025-02-27 DIAGNOSIS — F90.0 ATTENTION DEFICIT HYPERACTIVITY DISORDER (ADHD), PREDOMINANTLY INATTENTIVE TYPE: ICD-10-CM

## 2025-02-27 DIAGNOSIS — E78.00 PURE HYPERCHOLESTEROLEMIA: ICD-10-CM

## 2025-02-27 DIAGNOSIS — F43.23 ADJUSTMENT DISORDER WITH MIXED ANXIETY AND DEPRESSED MOOD: ICD-10-CM

## 2025-02-27 DIAGNOSIS — Z00.00 WELLNESS EXAMINATION: Primary | ICD-10-CM

## 2025-02-27 DIAGNOSIS — C67.9 MALIGNANT NEOPLASM OF URINARY BLADDER, UNSPECIFIED SITE (MULTI): ICD-10-CM

## 2025-02-27 PROCEDURE — 3008F BODY MASS INDEX DOCD: CPT | Performed by: INTERNAL MEDICINE

## 2025-02-27 PROCEDURE — 99214 OFFICE O/P EST MOD 30 MIN: CPT | Performed by: INTERNAL MEDICINE

## 2025-02-27 PROCEDURE — 99396 PREV VISIT EST AGE 40-64: CPT | Performed by: INTERNAL MEDICINE

## 2025-02-27 RX ORDER — METHYLPHENIDATE HYDROCHLORIDE 10 MG/1
10 CAPSULE, EXTENDED RELEASE ORAL EVERY MORNING
Qty: 30 CAPSULE | Refills: 0 | Status: SHIPPED | OUTPATIENT
Start: 2025-04-28 | End: 2025-05-28

## 2025-02-27 RX ORDER — METHYLPHENIDATE HYDROCHLORIDE 10 MG/1
10 CAPSULE, EXTENDED RELEASE ORAL EVERY MORNING
Qty: 30 CAPSULE | Refills: 0 | Status: SHIPPED | OUTPATIENT
Start: 2025-02-27 | End: 2025-03-29

## 2025-02-27 RX ORDER — METHYLPHENIDATE HYDROCHLORIDE 10 MG/1
10 CAPSULE, EXTENDED RELEASE ORAL EVERY MORNING
Qty: 30 CAPSULE | Refills: 0 | Status: SHIPPED | OUTPATIENT
Start: 2025-03-29 | End: 2025-04-28

## 2025-02-27 ASSESSMENT — ENCOUNTER SYMPTOMS
HEMATURIA: 0
ABDOMINAL PAIN: 0
WEAKNESS: 0
JOINT SWELLING: 0
FATIGUE: 0
BLOOD IN STOOL: 0
BACK PAIN: 1
ADENOPATHY: 0
WHEEZING: 0
FEVER: 0
COUGH: 0
NUMBNESS: 0
ACTIVITY CHANGE: 0
AGITATION: 0
ARTHRALGIAS: 1
DYSURIA: 0
ENDOCRINE NEGATIVE: 1
SHORTNESS OF BREATH: 0
FREQUENCY: 0
HEADACHES: 0
PALPITATIONS: 0
DIZZINESS: 0
EYE REDNESS: 0
CONSTIPATION: 0
DECREASED CONCENTRATION: 1
ALLERGIC/IMMUNOLOGIC NEGATIVE: 1

## 2025-02-27 NOTE — PROGRESS NOTES
"Subjective   Patient ID: Piedad Swann is a 59 y.o. female who presents for Follow-up.    She is a Nurse Practitioner , and recently established here.  She has several medical issues , meds reviewd.  She has bladder CA , getting treatment per urology, Dr Magana / Dr David Jean  She is Exsmoker, smoked < 1/2 ppd, quit in 2019  She has sleep apnea, but does not use cpap currently , await for a sleep study , scheduled.    + FH of cancers  Mom- DM, Breast CA  Sis -  , Cholangiocarcinoma  Colon cancer in family    Concern for weight gain, yet to start exercises. She has neck , upper back pain sometimes.She had CT which shows multilevel DJD.         Review of Systems   Constitutional:  Negative for activity change, fatigue and fever.   HENT:  Negative for congestion.    Eyes:  Negative for redness and visual disturbance.   Respiratory:  Negative for cough, shortness of breath and wheezing.    Cardiovascular:  Negative for chest pain, palpitations and leg swelling.   Gastrointestinal:  Negative for abdominal pain, blood in stool and constipation.   Endocrine: Negative.    Genitourinary:  Negative for dysuria, frequency, hematuria and urgency.        Bladder spasms  She had BCG treatments , cystoscopy   Musculoskeletal:  Positive for arthralgias and back pain. Negative for joint swelling.        Diffuse joint pain  C/o neck , upper back pain   Skin:  Negative for rash.   Allergic/Immunologic: Negative.    Neurological:  Negative for dizziness, weakness, numbness and headaches.        Snoring, daytime sleepiness   Hematological:  Negative for adenopathy.   Psychiatric/Behavioral:  Positive for decreased concentration. Negative for agitation and behavioral problems.         H/o ADHD , and Ritalin helped       Objective   /70 (BP Location: Left arm, Patient Position: Sitting, BP Cuff Size: Adult)   Pulse 72   Ht 1.702 m (5' 7\")   Wt 90.9 kg (200 lb 6.4 oz)   SpO2 97%   BMI 31.39 kg/m²     Physical " Exam  Constitutional:       Appearance: Normal appearance.   HENT:      Nose: No congestion.      Mouth/Throat:      Mouth: Mucous membranes are moist.      Pharynx: Oropharynx is clear.   Eyes:      Conjunctiva/sclera: Conjunctivae normal.   Cardiovascular:      Rate and Rhythm: Normal rate and regular rhythm.      Pulses: Normal pulses.      Heart sounds: Normal heart sounds. No murmur heard.  Pulmonary:      Effort: Pulmonary effort is normal.      Breath sounds: Normal breath sounds. No wheezing or rales.   Abdominal:      General: Abdomen is flat. Bowel sounds are normal.      Palpations: Abdomen is soft.      Hernia: No hernia is present.   Musculoskeletal:         General: No swelling or tenderness. Normal range of motion.      Cervical back: Normal range of motion and neck supple.      Right lower leg: No edema.      Left lower leg: No edema.   Skin:     General: Skin is warm and dry.      Capillary Refill: Capillary refill takes less than 2 seconds.      Findings: No rash.   Neurological:      General: No focal deficit present.      Mental Status: She is alert and oriented to person, place, and time.   Psychiatric:         Mood and Affect: Mood normal.         Behavior: Behavior normal.         Assessment/Plan        Bladder Carcinoma:    SinDx with low grade bladder cancer in 2020  - High grade bladder cancer in 11/2023 and completed BCG induction 2/2024   She was seeing  Dr Grace, now seeing Dr David Jean , seen on 2/20/25  Treated with induction BCG x 6 treatments, completed in February    She had multiple cystoscopy , every 3 months till 11/2026  Follow up renal US    Sleep apnea:  Referred to sleep Medicine, seen by  Dr Elisha Schwab  Sleep study, cpap titration was ordered  ( Not covered by Insurance )    GERD:  Avoid spicy , fried food, caffeine, NSAIDs  She does not smoke, alcohol socially     Hyperlipidemia:  Statin    Anxiety and depression:  ADHD  Zoloft po daily  Ritalin  LA 10 mg daily  May  go for counselling    Postmenopausal:  Mammogram , BMD reported normal  Calcium and vit D    Vision check  Flujarvis cisneros booster advised  Colonoscopy , she had once, and was normal

## 2025-05-02 RX ORDER — CIPROFLOXACIN 500 MG/1
500 TABLET ORAL ONCE
Status: COMPLETED | OUTPATIENT
Start: 2025-05-08 | End: 2025-05-08

## 2025-05-06 ENCOUNTER — HOSPITAL ENCOUNTER (OUTPATIENT)
Dept: RADIOLOGY | Facility: CLINIC | Age: 60
Discharge: HOME | End: 2025-05-06
Payer: COMMERCIAL

## 2025-05-06 DIAGNOSIS — C67.3 MALIGNANT NEOPLASM OF ANTERIOR WALL OF URINARY BLADDER (MULTI): ICD-10-CM

## 2025-05-06 PROCEDURE — 76770 US EXAM ABDO BACK WALL COMP: CPT

## 2025-05-08 ENCOUNTER — PROCEDURE VISIT (OUTPATIENT)
Dept: UROLOGY | Facility: HOSPITAL | Age: 60
End: 2025-05-08
Payer: COMMERCIAL

## 2025-05-08 VITALS — HEART RATE: 67 BPM | DIASTOLIC BLOOD PRESSURE: 85 MMHG | SYSTOLIC BLOOD PRESSURE: 116 MMHG

## 2025-05-08 DIAGNOSIS — C67.3 MALIGNANT NEOPLASM OF ANTERIOR WALL OF URINARY BLADDER (MULTI): ICD-10-CM

## 2025-05-08 DIAGNOSIS — R39.9 LOWER URINARY TRACT SYMPTOMS (LUTS): ICD-10-CM

## 2025-05-08 DIAGNOSIS — Z79.2 PROPHYLACTIC ANTIBIOTIC: ICD-10-CM

## 2025-05-08 LAB
POC APPEARANCE, URINE: CLEAR
POC BILIRUBIN, URINE: NEGATIVE
POC BLOOD, URINE: ABNORMAL
POC COLOR, URINE: YELLOW
POC GLUCOSE, URINE: ABNORMAL MG/DL
POC KETONES, URINE: NEGATIVE MG/DL
POC LEUKOCYTES, URINE: NEGATIVE
POC NITRITE,URINE: NEGATIVE
POC PH, URINE: 7 PH
POC PROTEIN, URINE: NEGATIVE MG/DL
POC SPECIFIC GRAVITY, URINE: 1.01
POC UROBILINOGEN, URINE: 0.2 EU/DL

## 2025-05-08 PROCEDURE — 2500000001 HC RX 250 WO HCPCS SELF ADMINISTERED DRUGS (ALT 637 FOR MEDICARE OP): Performed by: UROLOGY

## 2025-05-08 PROCEDURE — G2211 COMPLEX E/M VISIT ADD ON: HCPCS | Performed by: UROLOGY

## 2025-05-08 PROCEDURE — 52000 CYSTOURETHROSCOPY: CPT | Performed by: UROLOGY

## 2025-05-08 PROCEDURE — 81003 URINALYSIS AUTO W/O SCOPE: CPT | Performed by: UROLOGY

## 2025-05-08 RX ADMIN — CIPROFLOXACIN 500 MG: 500 TABLET ORAL at 15:43

## 2025-05-08 NOTE — PROGRESS NOTES
"FUV     Last Visit: 2/20/25    HISTORY OF PRESENT ILLNESS:   Robyn Swann is a 60 y.o. female who is being seen for cysto for evaluation of bladder cancer.     PAST MEDICAL HISTORY:  No past medical history on file.  - Dx with low grade bladder cancer in 2020  -negative TURBT in 2020  - High grade bladder cancer in 11/2023 and completed BCG induction 2/2024.    PAST SURGICAL HISTORY:  Past Surgical History:   Procedure Laterality Date    BREAST BIOPSY Left     benign ex bx    BREAST CYST ASPIRATION      HYSTERECTOMY          ALLERGIES:   Allergies   Allergen Reactions    Oxycodone-Acetaminophen Nausea/vomiting     Headache and nausea    Hydrocodone-Acetaminophen Hives     insomnia        MEDICATIONS:   Current Outpatient Medications   Medication Instructions    methylphenidate LA (RITALIN LA) 10 mg, oral, Every morning, Do not crush or chew.    methylphenidate LA (RITALIN LA) 10 mg, oral, Every morning, Do not crush or chew.    methylphenidate LA (RITALIN LA) 10 mg, oral, Every morning, Do not crush or chew.    omeprazole (PRILOSEC) 40 mg, oral, Daily before breakfast, Do not crush or chew.    pravastatin (PRAVACHOL) 80 mg, oral, Daily    sertraline (ZOLOFT) 100 mg, oral, Nightly        PHYSICAL EXAM:  There were no vitals taken for this visit.  Constitutional: Patient appears well-developed and well-nourished. No distress.    Pulmonary/Chest: Effort normal. No respiratory distress.   Abdominal: Soft, ND NT  : WNL  Musculoskeletal: Normal range of motion.    Neurological: Alert and oriented to person, place, and time.  Psychiatric: Normal mood and affect. Behavior is normal. Thought content normal.      Labs:  No results found for: \"TESTOSTERONE\"  No results found for: \"PSA\"  No components found for: \"CBC\"  Lab Results   Component Value Date    CREATININE 0.70 03/23/2024     No components found for: \"TESTOTMS\"  No results found for: \"TESTF\"    Renal US, 5/6/25:Unremarkable urinary bladder. Given patient's " history, further workup  with contrast-enhanced CT examination KUB performed if clinically warranted.      Discussion:   Doing well, no complaints. Denies any dysuria, hematuria, bothersome urinary frequency, urgency, or flank pain. Denies straining or pushing to urinate. The cystoscopy was completed today due to bladder cancer and demonstrated no tumors, stones or lesions. Cytology sent. 1 abx given to patient in clinic today. Patient instructed to follow up in 3 months for continued surveillance. Discussed possibly seeing Dr Pickard, will think about it and let us know.     Her son, recently  in a MVA and she has a 3 year old granddaughter, .     Cysto surveillance schedule: Will continue to monitor q3 months until 2026,  q6 months until 2028, and then yearly if no recurrences by year 5.    Assessment:      1. Malignant neoplasm of anterior wall of urinary bladder (Multi)  Cytology Consultation (Non-Gynecologic)    Cystoscopy      2. Prophylactic antibiotic  ciprofloxacin (Cipro) tablet 500 mg        Bladder Cancer  Robyn Swann is a 60 y.o. female here for FUV     Plan:   Follow up in 3 months for continued cystoscopic surveillance   All questions and concerns were addressed. Patient verbalizes understanding and has no other questions at this time.     Scribe Attestation  By signing my name below, IMinoo Scribe   attest that this documentation has been prepared under the direction and in the presence of David Jean MD.

## 2025-05-12 LAB
LABORATORY COMMENT REPORT: NORMAL
LABORATORY COMMENT REPORT: NORMAL
PATH REPORT.FINAL DX SPEC: NORMAL
PATH REPORT.GROSS SPEC: NORMAL
PATH REPORT.RELEVANT HX SPEC: NORMAL
PATH REPORT.TOTAL CANCER: NORMAL

## 2025-05-12 PROCEDURE — 88112 CYTOPATH CELL ENHANCE TECH: CPT | Performed by: PATHOLOGY

## 2025-05-12 PROCEDURE — 88112 CYTOPATH CELL ENHANCE TECH: CPT | Mod: TC,MCY | Performed by: UROLOGY

## 2025-05-29 ENCOUNTER — APPOINTMENT (OUTPATIENT)
Dept: UROLOGY | Facility: HOSPITAL | Age: 60
End: 2025-05-29
Payer: COMMERCIAL

## 2025-06-24 LAB
ALBUMIN SERPL-MCNC: 4.3 G/DL (ref 3.6–5.1)
ALP SERPL-CCNC: 95 U/L (ref 37–153)
ALT SERPL-CCNC: 25 U/L (ref 6–29)
ANION GAP SERPL CALCULATED.4IONS-SCNC: 10 MMOL/L (CALC) (ref 7–17)
AST SERPL-CCNC: 19 U/L (ref 10–35)
BASOPHILS # BLD AUTO: 53 CELLS/UL (ref 0–200)
BASOPHILS NFR BLD AUTO: 0.6 %
BILIRUB SERPL-MCNC: 0.4 MG/DL (ref 0.2–1.2)
BUN SERPL-MCNC: 12 MG/DL (ref 7–25)
CALCIUM SERPL-MCNC: 9.2 MG/DL (ref 8.6–10.4)
CHLORIDE SERPL-SCNC: 103 MMOL/L (ref 98–110)
CHOLEST SERPL-MCNC: 240 MG/DL
CHOLEST/HDLC SERPL: 4.4 (CALC)
CO2 SERPL-SCNC: 27 MMOL/L (ref 20–32)
CREAT SERPL-MCNC: 0.71 MG/DL (ref 0.5–1.03)
EGFRCR SERPLBLD CKD-EPI 2021: 98 ML/MIN/1.73M2
EOSINOPHIL # BLD AUTO: 107 CELLS/UL (ref 15–500)
EOSINOPHIL NFR BLD AUTO: 1.2 %
ERYTHROCYTE [DISTWIDTH] IN BLOOD BY AUTOMATED COUNT: 13.2 % (ref 11–15)
GLUCOSE SERPL-MCNC: 117 MG/DL (ref 65–99)
HCT VFR BLD AUTO: 46.5 % (ref 35–45)
HDLC SERPL-MCNC: 54 MG/DL
HGB BLD-MCNC: 14.8 G/DL (ref 11.7–15.5)
LDLC SERPL CALC-MCNC: 152 MG/DL (CALC)
LYMPHOCYTES # BLD AUTO: 2385 CELLS/UL (ref 850–3900)
LYMPHOCYTES NFR BLD AUTO: 26.8 %
MCH RBC QN AUTO: 29.5 PG (ref 27–33)
MCHC RBC AUTO-ENTMCNC: 31.8 G/DL (ref 32–36)
MCV RBC AUTO: 92.6 FL (ref 80–100)
MONOCYTES # BLD AUTO: 418 CELLS/UL (ref 200–950)
MONOCYTES NFR BLD AUTO: 4.7 %
NEUTROPHILS # BLD AUTO: 5936 CELLS/UL (ref 1500–7800)
NEUTROPHILS NFR BLD AUTO: 66.7 %
NONHDLC SERPL-MCNC: 186 MG/DL (CALC)
PLATELET # BLD AUTO: 288 THOUSAND/UL (ref 140–400)
PMV BLD REES-ECKER: 9.6 FL (ref 7.5–12.5)
POTASSIUM SERPL-SCNC: 4.2 MMOL/L (ref 3.5–5.3)
PROT SERPL-MCNC: 7.2 G/DL (ref 6.1–8.1)
RBC # BLD AUTO: 5.02 MILLION/UL (ref 3.8–5.1)
SODIUM SERPL-SCNC: 140 MMOL/L (ref 135–146)
TRIGL SERPL-MCNC: 202 MG/DL
WBC # BLD AUTO: 8.9 THOUSAND/UL (ref 3.8–10.8)

## 2025-06-25 DIAGNOSIS — F33.9 EPISODE OF RECURRENT MAJOR DEPRESSIVE DISORDER, UNSPECIFIED DEPRESSION EPISODE SEVERITY: ICD-10-CM

## 2025-06-26 ENCOUNTER — APPOINTMENT (OUTPATIENT)
Dept: PRIMARY CARE | Facility: CLINIC | Age: 60
End: 2025-06-26
Payer: COMMERCIAL

## 2025-06-26 VITALS
BODY MASS INDEX: 31.23 KG/M2 | WEIGHT: 199 LBS | DIASTOLIC BLOOD PRESSURE: 72 MMHG | HEIGHT: 67 IN | SYSTOLIC BLOOD PRESSURE: 122 MMHG | HEART RATE: 64 BPM | OXYGEN SATURATION: 94 %

## 2025-06-26 DIAGNOSIS — N95.9 MENOPAUSAL DISORDER: ICD-10-CM

## 2025-06-26 DIAGNOSIS — E78.2 MIXED HYPERLIPIDEMIA: ICD-10-CM

## 2025-06-26 DIAGNOSIS — F33.9 EPISODE OF RECURRENT MAJOR DEPRESSIVE DISORDER, UNSPECIFIED DEPRESSION EPISODE SEVERITY: ICD-10-CM

## 2025-06-26 DIAGNOSIS — G47.30 SLEEP APNEA, UNSPECIFIED TYPE: ICD-10-CM

## 2025-06-26 DIAGNOSIS — C67.9 MALIGNANT NEOPLASM OF URINARY BLADDER, UNSPECIFIED SITE (MULTI): ICD-10-CM

## 2025-06-26 DIAGNOSIS — K21.9 GASTROESOPHAGEAL REFLUX DISEASE WITHOUT ESOPHAGITIS: ICD-10-CM

## 2025-06-26 DIAGNOSIS — R73.9 HYPERGLYCEMIA: Primary | ICD-10-CM

## 2025-06-26 DIAGNOSIS — F43.23 ADJUSTMENT DISORDER WITH MIXED ANXIETY AND DEPRESSED MOOD: ICD-10-CM

## 2025-06-26 LAB — POC HEMOGLOBIN A1C: 6 % (ref 4.2–6.5)

## 2025-06-26 PROCEDURE — 83036 HEMOGLOBIN GLYCOSYLATED A1C: CPT | Performed by: INTERNAL MEDICINE

## 2025-06-26 PROCEDURE — 99214 OFFICE O/P EST MOD 30 MIN: CPT | Performed by: INTERNAL MEDICINE

## 2025-06-26 PROCEDURE — 3008F BODY MASS INDEX DOCD: CPT | Performed by: INTERNAL MEDICINE

## 2025-06-26 RX ORDER — SERTRALINE HYDROCHLORIDE 100 MG/1
100 TABLET, FILM COATED ORAL NIGHTLY
Qty: 90 TABLET | Refills: 1 | Status: SHIPPED | OUTPATIENT
Start: 2025-06-26

## 2025-06-26 RX ORDER — OMEPRAZOLE 40 MG/1
40 CAPSULE, DELAYED RELEASE ORAL
Qty: 90 CAPSULE | Refills: 1 | Status: SHIPPED | OUTPATIENT
Start: 2025-06-26

## 2025-06-26 RX ORDER — ROSUVASTATIN CALCIUM 20 MG/1
20 TABLET, COATED ORAL DAILY
Qty: 100 TABLET | Refills: 3 | Status: SHIPPED | OUTPATIENT
Start: 2025-06-26 | End: 2026-07-31

## 2025-06-26 ASSESSMENT — ENCOUNTER SYMPTOMS
HEADACHES: 0
AGITATION: 0
SHORTNESS OF BREATH: 0
HEMATURIA: 0
WHEEZING: 0
ALLERGIC/IMMUNOLOGIC NEGATIVE: 1
ACTIVITY CHANGE: 0
DYSURIA: 0
DIZZINESS: 0
PALPITATIONS: 0
WEAKNESS: 0
ARTHRALGIAS: 1
FREQUENCY: 0
ENDOCRINE NEGATIVE: 1
EYE REDNESS: 0
ABDOMINAL PAIN: 0
DECREASED CONCENTRATION: 1
ADENOPATHY: 0
BLOOD IN STOOL: 0
FATIGUE: 0
COUGH: 0
BACK PAIN: 1
CONSTIPATION: 0
NUMBNESS: 0
SLEEP DISTURBANCE: 1
JOINT SWELLING: 0
FEVER: 0

## 2025-06-26 NOTE — PROGRESS NOTES
"Subjective   Patient ID: Piedad Swann is a 60 y.o. female who presents for Follow-up.    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  She has bladder CA , getting treatment per urology, Dr Magana / Dr David Jean, scheduled for cystoscopy in August .  She is Exsmoker, smoked < 1/2 ppd, quit in 2019  She has sleep apnea, but does not use cpap currently , await for a sleep study , scheduled.    + FH of cancers  Mom- DM, Breast CA  Sis -  , Cholangiocarcinoma  Colon cancer in family    Concern for weight gain, yet to start exercises. She has neck , upper back pain sometimes.She had CT which shows multilevel DJD.         Review of Systems   Constitutional:  Negative for activity change, fatigue and fever.   HENT:  Negative for congestion.    Eyes:  Negative for redness and visual disturbance.   Respiratory:  Negative for cough, shortness of breath and wheezing.    Cardiovascular:  Negative for chest pain, palpitations and leg swelling.   Gastrointestinal:  Negative for abdominal pain, blood in stool and constipation.   Endocrine: Negative.    Genitourinary:  Negative for dysuria, frequency, hematuria and urgency.        Bladder spasms  She had BCG treatments , cystoscopy   Musculoskeletal:  Positive for arthralgias and back pain. Negative for joint swelling.        Diffuse joint pain  C/o neck , upper back pain   Skin:  Negative for rash.   Allergic/Immunologic: Negative.    Neurological:  Negative for dizziness, weakness, numbness and headaches.        Snoring, daytime sleepiness   Hematological:  Negative for adenopathy.   Psychiatric/Behavioral:  Positive for decreased concentration and sleep disturbance. Negative for agitation and behavioral problems.         H/o ADHD , and Ritalin helped       Objective   /72 (BP Location: Left arm, Patient Position: Sitting, BP Cuff Size: Adult)   Pulse 64   Ht 1.702 m (5' 7\")   Wt 90.3 kg (199 lb)   SpO2 94%   " BMI 31.17 kg/m²     Physical Exam  Constitutional:       Appearance: Normal appearance.   HENT:      Nose: No congestion.      Mouth/Throat:      Mouth: Mucous membranes are moist.      Pharynx: Oropharynx is clear.   Eyes:      Conjunctiva/sclera: Conjunctivae normal.   Cardiovascular:      Rate and Rhythm: Normal rate and regular rhythm.      Pulses: Normal pulses.      Heart sounds: Normal heart sounds. No murmur heard.  Pulmonary:      Effort: Pulmonary effort is normal.      Breath sounds: Normal breath sounds. No wheezing or rales.   Abdominal:      General: Abdomen is flat. Bowel sounds are normal.      Palpations: Abdomen is soft.      Hernia: No hernia is present.   Musculoskeletal:         General: No swelling or tenderness. Normal range of motion.      Cervical back: Normal range of motion and neck supple.      Right lower leg: No edema.      Left lower leg: No edema.   Skin:     General: Skin is warm and dry.      Capillary Refill: Capillary refill takes less than 2 seconds.      Findings: No rash.   Neurological:      General: No focal deficit present.      Mental Status: She is alert and oriented to person, place, and time.      Motor: No weakness.      Gait: Gait normal.   Psychiatric:         Mood and Affect: Mood normal.         Behavior: Behavior normal.         Assessment/Plan        Bladder Carcinoma:    SinDx with low grade bladder cancer in 2020  - High grade bladder cancer in 11/2023 and completed BCG induction 2/2024   She was seeing  Dr Grace, now seeing Dr David Jean , seen on 2/20/25  Treated with induction BCG x 6 treatments, completed in February    She had multiple cystoscopy , every 3 months till 11/2026, next in August  Follow up renal US    Sleep apnea:  Referred to sleep Medicine, seen by  Dr Elisha Schwab  Sleep study, cpap titration was ordered  ( Not covered by Insurance )    GERD:  Avoid spicy , fried food, caffeine, NSAIDs  She does not smoke, alcohol socially      Hyperlipidemia:  Mixed , elevated  Change pravastatin to Rosuvastatin    Hyperglycemia :  FH of DM  Check A1C , 6.0  BS was 117  Low carb diet, reduce sweets    Anxiety and depression:  ADHD  Zoloft po daily  Ritalin  LA 10 mg daily, she tried and stopped   May go for counselling    Postmenopausal:  Mammogram , BMD reported normal  Calcium and vit D    Vision check  Flushot, covid booster advised  Colonoscopy , she had once, and was normal

## 2025-08-19 RX ORDER — CIPROFLOXACIN 500 MG/1
500 TABLET, FILM COATED ORAL ONCE
Status: COMPLETED | OUTPATIENT
Start: 2025-08-21 | End: 2025-08-21

## 2025-08-21 ENCOUNTER — PROCEDURE VISIT (OUTPATIENT)
Dept: UROLOGY | Facility: HOSPITAL | Age: 60
End: 2025-08-21
Payer: COMMERCIAL

## 2025-08-21 DIAGNOSIS — Z79.2 PROPHYLACTIC ANTIBIOTIC: ICD-10-CM

## 2025-08-21 DIAGNOSIS — R39.9 LOWER URINARY TRACT SYMPTOMS (LUTS): ICD-10-CM

## 2025-08-21 DIAGNOSIS — C67.3 MALIGNANT NEOPLASM OF ANTERIOR WALL OF URINARY BLADDER (MULTI): ICD-10-CM

## 2025-08-21 LAB
POC APPEARANCE, URINE: CLEAR
POC BILIRUBIN, URINE: NEGATIVE
POC BLOOD, URINE: ABNORMAL
POC COLOR, URINE: YELLOW
POC GLUCOSE, URINE: NEGATIVE MG/DL
POC KETONES, URINE: NEGATIVE MG/DL
POC LEUKOCYTES, URINE: ABNORMAL
POC NITRITE,URINE: NEGATIVE
POC PH, URINE: 6 PH
POC PROTEIN, URINE: NEGATIVE MG/DL
POC SPECIFIC GRAVITY, URINE: 1.02
POC UROBILINOGEN, URINE: 0.2 EU/DL

## 2025-08-21 PROCEDURE — 52000 CYSTOURETHROSCOPY: CPT | Performed by: UROLOGY

## 2025-08-21 PROCEDURE — 81003 URINALYSIS AUTO W/O SCOPE: CPT | Mod: QW | Performed by: UROLOGY

## 2025-08-21 PROCEDURE — 2500000001 HC RX 250 WO HCPCS SELF ADMINISTERED DRUGS (ALT 637 FOR MEDICARE OP): Performed by: UROLOGY

## 2025-08-21 RX ADMIN — CIPROFLOXACIN 500 MG: 500 TABLET ORAL at 10:28

## 2026-01-08 ENCOUNTER — APPOINTMENT (OUTPATIENT)
Dept: PRIMARY CARE | Facility: CLINIC | Age: 61
End: 2026-01-08
Payer: COMMERCIAL